# Patient Record
Sex: FEMALE | Race: OTHER | Employment: FULL TIME | URBAN - METROPOLITAN AREA
[De-identification: names, ages, dates, MRNs, and addresses within clinical notes are randomized per-mention and may not be internally consistent; named-entity substitution may affect disease eponyms.]

---

## 2017-10-09 ENCOUNTER — HOSPITAL ENCOUNTER (OUTPATIENT)
Dept: WOMENS IMAGING | Age: 52
Discharge: HOME | End: 2017-10-09
Payer: COMMERCIAL

## 2017-10-09 DIAGNOSIS — Z12.39 ENCOUNTER FOR SCREENING BREAST EXAMINATION: ICD-10-CM

## 2017-10-09 PROCEDURE — G0202 SCR MAMMO BI INCL CAD: HCPCS

## 2018-12-05 ENCOUNTER — HOSPITAL ENCOUNTER (OUTPATIENT)
Dept: WOMENS IMAGING | Age: 53
Discharge: HOME | End: 2018-12-07
Payer: COMMERCIAL

## 2018-12-05 DIAGNOSIS — Z12.31 ENCOUNTER FOR SCREENING MAMMOGRAM FOR BREAST CANCER: ICD-10-CM

## 2018-12-05 PROCEDURE — 77067 SCR MAMMO BI INCL CAD: CPT

## 2019-12-10 ENCOUNTER — HOSPITAL ENCOUNTER (OUTPATIENT)
Dept: WOMENS IMAGING | Age: 54
Discharge: HOME | End: 2019-12-12
Payer: COMMERCIAL

## 2019-12-10 DIAGNOSIS — Z12.31 ENCOUNTER FOR SCREENING MAMMOGRAM FOR BREAST CANCER: ICD-10-CM

## 2019-12-10 PROCEDURE — 77063 BREAST TOMOSYNTHESIS BI: CPT

## 2021-03-02 ENCOUNTER — HOSPITAL ENCOUNTER (OUTPATIENT)
Dept: WOMENS IMAGING | Age: 56
Discharge: HOME | End: 2021-03-04
Payer: COMMERCIAL

## 2021-03-02 VITALS — HEIGHT: 63 IN | BODY MASS INDEX: 24.27 KG/M2

## 2021-03-02 DIAGNOSIS — Z12.31 ENCOUNTER FOR SCREENING MAMMOGRAM FOR MALIGNANT NEOPLASM OF BREAST: ICD-10-CM

## 2021-03-02 PROCEDURE — 77063 BREAST TOMOSYNTHESIS BI: CPT

## 2022-02-10 ENCOUNTER — OFFICE VISIT (OUTPATIENT)
Dept: ORTHOPEDIC SURGERY | Age: 57
End: 2022-02-10
Payer: COMMERCIAL

## 2022-02-10 VITALS
WEIGHT: 137 LBS | HEART RATE: 70 BPM | TEMPERATURE: 97.7 F | BODY MASS INDEX: 24.27 KG/M2 | HEIGHT: 63 IN | OXYGEN SATURATION: 99 %

## 2022-02-10 DIAGNOSIS — M17.12 PRIMARY OSTEOARTHRITIS OF LEFT KNEE: Primary | ICD-10-CM

## 2022-02-10 PROCEDURE — 99214 OFFICE O/P EST MOD 30 MIN: CPT | Performed by: ORTHOPAEDIC SURGERY

## 2022-02-10 RX ORDER — METHYLPREDNISOLONE 4 MG/1
TABLET ORAL
Qty: 21 TABLET | Refills: 0 | Status: SHIPPED | OUTPATIENT
Start: 2022-02-10

## 2022-02-11 NOTE — PROGRESS NOTES
Patient ID:  Hudson Taylor is a 60 y.o. female who presents today for follow up of left knee pain. I have seen her in the past.  The patient has tried multiple anti-inflammatories. She has tried ibuprofen and Naprosyn and meloxicam per her report. She had an intra-articular steroid injection and it failed to provide her with any relief. That is, other than the local anesthetic for a few hours after the injection. She is here today complaining of left medial and anterior knee pain    Injury: no    Location: left knee pain, located on the medial and anterior aspect of the knee  Pain: yes; 7 on a scale of 1 to 10  Onset: gradual  Duration: 2 years  Frequency:  occurs daily  Quality: aching and boring   Swelling: patient notes intermittent swelling of the joint  Aggravating factors: weight bearing activity, standing and walking  Alleviating factors: removing weight from leg and rest  Mechanical symptoms: catching and grinding  Radiation: no    Activities: walking independently  Restriction:  decreased ambulatory tolerance  Progression:  worsening    Previous treatment:  anti-inflammatories and steroid injection   NSAIDs:  ibuprofen/motrin, aleve, naproxyn and meloxicam  PT:  none    Medications:    Current Outpatient Medications on File Prior to Visit   Medication Sig Dispense Refill    zolpidem (AMBIEN CR) 12.5 MG CR tablet Take 12.5 mg by mouth      escitalopram (LEXAPRO) 10 MG tablet Take 10 mg by mouth       No current facility-administered medications on file prior to visit.        Allergies:    No Known Allergies    Physical Exam:    Examination of the left knee    Gait:  antalgic gait affecting left  Inspection:  neutral  Swelling:  none  Erythema:  none  Ecchymosis:  none  Effusion:  none  Palpation:  distal medial femoral condyle, medial joint line and lateral patella  Extension:  5  Flexion:  120  Strength:  5  Varus/Valgus stability:  none  Anterior/Posterior Instability:  none  Antwon: negative  Thessaly:  positive  Modified Apley:  negative  Lachman:  negative  Patellar compression:  positive  Neurological/Vascular:  Sensation grossly intact. Dorsalis pedis palpable and 2+    Radiographs:  Radiographs of the left knee were personally reviewed, bilateral standing AP, bilateral notch, lateral left knee and bilateral sunrise and they revealed: The patient has a right partial medial knee replacement in place. It appears to be well fixed and well aligned. There is some progression of the lateral osteoarthritis on the right side. On the left side, the patient has moderate to severe osteoarthritis of the medial compartment of the left knee. She is maintaining a joint space laterally. She does have bilateral patellofemoral degenerative wear. No fractures. No bone lesions. MRI: None    Diagnosis:   Diagnosis Orders   1. Primary osteoarthritis of left knee         Plan:    The patient has osteoarthritis of the left knee. Treatment options were discussed. This is a young patient she is 60years old. She is employed as a . She wishes to hold off on surgery as long as she can. 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 She had a steroid injection which failed to provide her with any relief other than transient relief from the local anesthetic. She has tried multiple anti-inflammatories. She has modified her activity. So, together we discussed the other treatment options. This would consist of viscosupplementation, physical therapy, and knee replacement surgery. The patient wishes to hold off on surgery if possible. I think she would be a good candidate for a course of viscosupplementation. We shall submit for authorization. Rationale for Viscosupplementation: Renewal Request   As a part of a multimodal treatment plan, we are requesting authorization of hyaluronic acid viscosupplementation injections for the improvement of symptoms related to osteoarthritis.  Authorization is being requested for treatment of the Left knee. Rationale for authorization of these injections is based on the following elements:     Signs and Symptoms   Length of symptoms > 3 months   Pain interferes with ADLs? Yes   Radiographic evidence of OA? Yes     Previous Treatments   Bracing attempted? No   Formal PT attempted? No  NSAID medication attempted? Yes   Corticosteroid injection attempted? Yes   Weight management attempted? No -not required    Prior Viscosupplementation   Prior viscosupplementation? No  Prior viscosupplementation improved  symptoms? N/A   Prior viscosupplementation improved  symptoms at least 6 months? N/A      Requested Viscosupplementation   Preferred product: Durolane   Alternative product: Synvisc One or payor preferred        No orders of the defined types were placed in this encounter. Orders Placed This Encounter   Medications    methylPREDNISolone (MEDROL DOSEPACK) 4 MG tablet     Sig: On days 1-6 take as directed on package for first 6-day course. Dispense:  21 tablet     Refill:  0       Return for For viscosupplementation if approved.     Miguel Ángel Bella MD

## 2022-02-28 ENCOUNTER — TELEPHONE (OUTPATIENT)
Dept: ORTHOPEDIC SURGERY | Age: 57
End: 2022-02-28

## 2022-02-28 NOTE — TELEPHONE ENCOUNTER
Per Zan, effective 12/01/2017 Intra articular Hyaluronan injection of the knee are considered not medically necessary and will no longer be covered with [facility].

## 2022-05-12 ENCOUNTER — HOSPITAL ENCOUNTER (OUTPATIENT)
Dept: WOMENS IMAGING | Age: 57
Discharge: HOME | End: 2022-05-14
Payer: COMMERCIAL

## 2022-05-12 VITALS — BODY MASS INDEX: 24.27 KG/M2 | HEIGHT: 63 IN

## 2022-05-12 DIAGNOSIS — Z12.31 SCREENING MAMMOGRAM, ENCOUNTER FOR: ICD-10-CM

## 2022-05-12 PROCEDURE — 77063 BREAST TOMOSYNTHESIS BI: CPT

## 2022-06-28 DIAGNOSIS — M17.12 PRIMARY OSTEOARTHRITIS OF LEFT KNEE: Primary | ICD-10-CM

## 2022-06-30 ENCOUNTER — OFFICE VISIT (OUTPATIENT)
Dept: ORTHOPEDIC SURGERY | Age: 57
End: 2022-06-30
Payer: COMMERCIAL

## 2022-06-30 VITALS
HEART RATE: 68 BPM | BODY MASS INDEX: 24.27 KG/M2 | HEIGHT: 63 IN | TEMPERATURE: 97 F | OXYGEN SATURATION: 93 % | WEIGHT: 137 LBS

## 2022-06-30 DIAGNOSIS — M17.12 PRIMARY OSTEOARTHRITIS OF LEFT KNEE: Primary | ICD-10-CM

## 2022-06-30 PROCEDURE — 20610 DRAIN/INJ JOINT/BURSA W/O US: CPT | Performed by: ORTHOPAEDIC SURGERY

## 2022-06-30 PROCEDURE — 99214 OFFICE O/P EST MOD 30 MIN: CPT | Performed by: ORTHOPAEDIC SURGERY

## 2022-06-30 RX ORDER — GABAPENTIN 100 MG/1
CAPSULE ORAL
COMMUNITY
Start: 2022-06-20

## 2022-06-30 RX ORDER — LIDOCAINE HYDROCHLORIDE 10 MG/ML
8 INJECTION, SOLUTION INFILTRATION; PERINEURAL ONCE
Status: COMPLETED | OUTPATIENT
Start: 2022-06-30 | End: 2022-06-30

## 2022-06-30 RX ORDER — TRIAMCINOLONE ACETONIDE 40 MG/ML
80 INJECTION, SUSPENSION INTRA-ARTICULAR; INTRAMUSCULAR ONCE
Status: COMPLETED | OUTPATIENT
Start: 2022-06-30 | End: 2022-06-30

## 2022-06-30 RX ORDER — CELECOXIB 200 MG/1
200 CAPSULE ORAL DAILY
COMMUNITY
Start: 2022-02-11 | End: 2022-08-10

## 2022-06-30 RX ADMIN — LIDOCAINE HYDROCHLORIDE 8 ML: 10 INJECTION, SOLUTION INFILTRATION; PERINEURAL at 16:05

## 2022-06-30 RX ADMIN — TRIAMCINOLONE ACETONIDE 80 MG: 40 INJECTION, SUSPENSION INTRA-ARTICULAR; INTRAMUSCULAR at 16:06

## 2022-06-30 NOTE — PROGRESS NOTES
Patient's name, date of birth, and allergies have been confirmed. Patient is aware that injection is to be given in Left knee. He/she is aware that they will be seeing Dr. Marzena Fraga and the injection will be given by him.

## 2022-06-30 NOTE — PROGRESS NOTES
Patient ID:  Julio Romero is a 60 y.o. female who presents today for follow up of left knee pain. Injury: no    Location: left knee pain, located on the medial, lateral and anterior aspect of the knee  Pain: yes; 7 on a scale of 1 to 10  Onset: gradual  Duration: 2 years  Frequency:  occurs daily  Quality: aching and boring   Swelling: patient notes intermittent swelling of the joint  Aggravating factors: weight bearing activity, standing and walking  Alleviating factors: removing weight from leg and rest  Mechanical symptoms: none  Radiation: no    Activities: walking independently  Restriction:  decreased ambulatory tolerance  Progression:  worsening    Previous treatment:  anti-inflammatories and steroid injection   NSAIDs:  ibuprofen/motrin, naproxyn, meloxicam and celebrex  PT:  none    Medications:    Current Outpatient Medications on File Prior to Visit   Medication Sig Dispense Refill    celecoxib (CELEBREX) 200 MG capsule Take 200 mg by mouth daily      gabapentin (NEURONTIN) 100 MG capsule take 1 capsule by mouth at bedtime      zolpidem (AMBIEN CR) 12.5 MG CR tablet Take 12.5 mg by mouth      escitalopram (LEXAPRO) 10 MG tablet Take 10 mg by mouth      methylPREDNISolone (MEDROL DOSEPACK) 4 MG tablet On days 1-6 take as directed on package for first 6-day course. (Patient not taking: Reported on 6/30/2022) 21 tablet 0     No current facility-administered medications on file prior to visit.        Allergies:    No Known Allergies    Physical Exam:    Examination of the left knee    Gait:  antalgic gait affecting left  Inspection:  genu varus  Swelling:  none  Erythema:  none  Ecchymosis:  none  Effusion:  1+  Palpation:  distal medial femoral condyle, medial joint line, distal lateral femoral condyle and lateral patella  Extension:  5  Flexion:  120  Strength:  5  Varus/Valgus stability:  none  Anterior/Posterior Instability:  none  Antwon:  negative  Thessaly:  positive  Modified Apley: negative  Lachman:  negative  Patellar compression:  positive  Neurological/Vascular:  Sensation grossly intact. Dorsalis pedis palpable and 2+    Radiographs:  Radiographs of the left knee were personally reviewed, bilateral standing AP, bilateral notch, bilateral sunrise and lateral left knee and they revealed:  no evidence of fracture, severe medial knee oa, patellofemoral knee oa and The lateral joint space is not normal either; there is slight narrowing there as well. No intra-articular loose bodies. No destructive bony lesions are noted. On the right side, the patient has a partial medial knee replacement in place. It is cemented. It is well fixed. No evidence of any loosening. She has developed some lateral and mild patellofemoral osteoarthritis on the right side as well. The polyethylene is appropriately positioned within the partial medial knee replacement. MRI: None    Diagnosis:   Diagnosis Orders   1. Primary osteoarthritis of left knee  MN ARTHROCENTESIS ASPIR&/INJ MAJOR JT/BURSA W/O US       Plan:    Patient has osteoarthritis of the right knee. I thought that she was a good candidate for viscosupplementation but this was denied by her insurance company. The patient continues to work in heavy construction. Treatment options were discussed. Despite the fact that she did not get a good response from the last steroid injection, she wished to try that again. She is getting pretty miserable in terms of the pain with her vocation. So we will administer the steroid injection. Additionally, working to discontinue the Celebrex which she said did not do much for her. We will start her on Voltaren. I am also going to find out about ordering viscosupplementation from [facility] pharmacies and get back with her.     Time Out  [x] Patient Verified  [x] Site Verified  [x] Laterality Verified  [x] Procedure Verified    Before aspiration/injection risks/benefits of a cortisone injection including infection, local skin irritation, skin atrophy, continued pain/discomfort, elevated blood sugar, burning, failure to relieve pain, possible late infection were discussed with patient. Patient verbalized understanding and wanted to proceed with planned procedure. After prepping and draping the left knee in the usual sterile fashion, 2 cc of 40 mg/ml kenalog with 8 cc of 1% lidocaine were injected intra-articularly without any complications. Patient tolerated this well. Post-procedure discomfort can be alleviated with additional medications/ice/elevation/rest over the first 24 hours as recommended. The patient tolerated the procedure well. If fluid was aspirated it was sent for further studies  in sterile specimen container as ordered to the lab    Orders Placed This Encounter   Procedures    CT ARTHROCENTESIS ASPIR&/INJ MAJOR JT/BURSA W/O US       Orders Placed This Encounter   Medications    diclofenac (VOLTAREN) 50 MG EC tablet     Sig: Take 1 tablet by mouth 2 times daily (with meals)     Dispense:  60 tablet     Refill:  3    lidocaine 1 % injection 8 mL    triamcinolone acetonide (KENALOG-40) injection 80 mg       No follow-ups on file.     Jareth Fallon MD

## 2022-10-28 ENCOUNTER — OFFICE VISIT (OUTPATIENT)
Dept: ORTHOPEDIC SURGERY | Age: 57
End: 2022-10-28
Payer: COMMERCIAL

## 2022-10-28 DIAGNOSIS — M17.12 PRIMARY OSTEOARTHRITIS OF LEFT KNEE: Primary | ICD-10-CM

## 2022-10-28 PROCEDURE — 99214 OFFICE O/P EST MOD 30 MIN: CPT | Performed by: ORTHOPAEDIC SURGERY

## 2022-10-28 NOTE — PROGRESS NOTES
Patient ID:  Jenn Kang is a 59 y.o. female who presents today for follow up of left knee pain. Injury: no    Location: left knee pain, located on the medial and anterior aspect of the knee  Pain: yes; 8 on a scale of 1 to 10  Onset: gradual  Duration: 3 years  Frequency:  occurs daily  Quality: aching and boring   Swelling: patient notes intermittent swelling of the joint  Aggravating factors: weight bearing activity, standing, and walking  Alleviating factors: removing weight from leg and rest  Mechanical symptoms: none  Radiation: no    Activities: walking independently  Restriction:  decreased ambulatory tolerance  Progression:  worsening    Previous treatment:  anti-inflammatories and steroid injection   NSAIDs:  ibuprofen/motrin, naproxyn, and meloxicam  PT:  none    Medications:    Current Outpatient Medications on File Prior to Visit   Medication Sig Dispense Refill    gabapentin (NEURONTIN) 100 MG capsule take 1 capsule by mouth at bedtime      diclofenac (VOLTAREN) 50 MG EC tablet Take 1 tablet by mouth 2 times daily (with meals) 60 tablet 3    zolpidem (AMBIEN CR) 12.5 MG CR tablet Take 12.5 mg by mouth      escitalopram (LEXAPRO) 10 MG tablet Take 10 mg by mouth      celecoxib (CELEBREX) 200 MG capsule Take 200 mg by mouth daily      methylPREDNISolone (MEDROL DOSEPACK) 4 MG tablet On days 1-6 take as directed on package for first 6-day course. (Patient not taking: Reported on 6/30/2022) 21 tablet 0     No current facility-administered medications on file prior to visit.        Allergies:    No Known Allergies    Physical Exam:    Examination of the left knee    Gait:  antalgic gait affecting left  Inspection:  neutral  Swelling:  none  Erythema:  none  Ecchymosis:  none  Effusion:  1+  Palpation:  distal medial femoral condyle, medial joint line, medial patella, and lateral patella  Extension:  5  Flexion:  120  Strength:  5  Varus/Valgus stability:  none  Anterior/Posterior Instability: none  Antwon:  negative  Thessaly:  positive  Modified Apley:  positive  Lachman:  negative  Patellar compression:  positive  Neurological/Vascular:  Sensation grossly intact. Dorsalis pedis palpable and 2+    Radiographs:  None today but prior radiographs show that the patient has severe medial and patellofemoral osteoarthritis on the left knee. MRI: None    Diagnosis:   Diagnosis Orders   1. Primary osteoarthritis of left knee  Basic Metabolic Panel    CBC with Auto Differential    Culture, Urine    Ferritin    Hemoglobin A1C    Iron and TIBC    Urinalysis    Type and screen    Transferrin    Protime-INR    Prealbumin    XR KNEE LEFT (MIN 4 VIEWS)          Plan:    Patient has osteoarthritis of the left knee. She has been dealing with this for a long time. She has reached the end of the line. She wishes to discuss a left total knee replacement. The patient is opted to proceed with a knee replacement surgery. I brought the model in, and we sat down and talked about surgery. We talked about the recovery. I stressed the importance of physical therapy and active participation in physical therapy to the patient in order to achieve a satisfactory postoperative outcome. We went over the risks of surgery. Risks include, but are not limited to, infection, neurovascular injury, hematoma formation, wound healing complications, blood clot, persistent postoperative pain, ligament injury, and risks of anesthesia. The patient expressed understanding and wishes to proceed with a total knee replacement as above. Orders Placed This Encounter   Procedures    Culture, Urine     Standing Status:   Future     Standing Expiration Date:   10/28/2023     Order Specific Question:   Specify (ex-cath, midstream, cysto, etc)?      Answer:   MIDSTREAM    XR KNEE LEFT (MIN 4 VIEWS)     Standing Status:   Future     Standing Expiration Date:   10/28/2023     Scheduling Instructions:      Bilateral WB AP, lateral left knee, bilateral skyline, bilateral tunnel     Order Specific Question:   Reason for exam:     Answer:   knee pain    Basic Metabolic Panel     Standing Status:   Future     Standing Expiration Date:   10/28/2023    CBC with Auto Differential     Standing Status:   Future     Standing Expiration Date:   10/28/2023    Ferritin     Standing Status:   Future     Standing Expiration Date:   10/28/2023    Hemoglobin A1C     Standing Status:   Future     Standing Expiration Date:   10/28/2023    Iron and TIBC     Standing Status:   Future     Standing Expiration Date:   10/28/2023     Order Specific Question:   Is Patient Fasting? Answer:   NO     Order Specific Question:   No of Hours? Answer:   0    Urinalysis     Standing Status:   Future     Standing Expiration Date:   10/28/2023    Transferrin     Standing Status:   Future     Standing Expiration Date:   10/28/2023    Protime-INR     Standing Status:   Future     Standing Expiration Date:   10/28/2023     Order Specific Question:   Daily Coumadin Dose? Answer:   NO    Prealbumin     Standing Status:   Future     Standing Expiration Date:   10/28/2023    Type and screen     Standing Status:   Future     Standing Expiration Date:   10/28/2023       Orders Placed This Encounter   Medications    Misc. Devices (BATH/SHOWER SEAT) MISC     Sig: Dispense 1 Shower/Bath seat     Dispense:  1 each     Refill:  0    Misc. Devices (CLASSICS ROLLING WALKER) MISC     Sig: Dispense 1 rolling walker     Dispense:  1 each     Refill:  0    Misc. Devices (RAISED TOILET SEAT) MISC     Sig: Dispense 1 raised toilet seat     Dispense:  1 each     Refill:  0       No follow-ups on file.     Carlton Rey MD        Surgery Phone: [phone] [place] [facility]   Surgery Fax: [phone]    Phone: [phone]          Fax: [phone]    Orthopedics: Surgery Scheduling, PAT & PRE-OP Order Form  Call to advance [place] at [phone] at least 24 hours prior to date of service     Surgery Location: [facility] Surgery: [facility], [address] [place] [address] Road  OFFICE   Surgeon's Isaac Aviles MD Surgery Date: 22  Time: to follow  Patient's Name: Brendon Mendieta : 1965    #:  [id number]  Gender: female  Home Phone:  [phone] Cell Phone: [phone]  Emergency Contact:  Georgina Pacheco   Phone: [phone]  Payor: Deyvi Richard /  /  /    ID No.: [id number]      PROVIDER TO COMPLETE:  Diagnosis: The encounter diagnosis was Primary osteoarthritis of left knee. Procedure/Consent: Left total knee arthroplasty  CPT CODES: 56403  Case Comments/Implants: [employer] [employer] Total Knee System  Surgery Scheduled as: Outpatient  Anesthesia Requested: Choice with adductor canal block  Referring Family Doctor: Edwige Wilcox MD   PAT  [x] Corie LEYVA Date/Time:                                                            [x] History & Physical [] Physician will Provide [] Attached [] Dictated [] Other  [x] Follow Anesthesia Pre-Op Orders for X-rays, [facility]     [x] SN & PT to evaluate and treat/educate disease management, medications, home safety & equipment needs for total joint patients  [] Other: ____________________________________________________  Consults: Medical/Cardiac Clearance done by  ____________________  PRE-OP ORDERS:   Allergies: Patient has no known allergies.  Latex Allergies:             Diabetic:           [] IV ________________________  [x] IV Start with J-loop     Preprinted Orders: Attached [] Yes [] No   ANTIBIOTIC PRE-OP: [x] ANCEF 2 gram IVPB if > 120 kg 3 grams IVPB within 1 hour of incision, if ALLERGIC, use VANCOMYCIN 1 gram IV, 2 hours pre-op  [x] TXA Protocol [] Other:   [x] NPO   [] Betablocker (if needed) _____________________________________   [x] Knee high anti-embolic hose [] Thigh high anti-embolic hose   Other: _____________ chlorhexidine_________________________________________    Physician Signature Required:    Date/Time: 10/28/2022

## 2022-12-06 ENCOUNTER — OFFICE VISIT (OUTPATIENT)
Dept: ORTHOPEDIC SURGERY | Age: 57
End: 2022-12-06

## 2022-12-06 VITALS
SYSTOLIC BLOOD PRESSURE: 139 MMHG | HEIGHT: 63 IN | TEMPERATURE: 97.1 F | BODY MASS INDEX: 25.25 KG/M2 | WEIGHT: 142.5 LBS | OXYGEN SATURATION: 99 % | HEART RATE: 66 BPM | DIASTOLIC BLOOD PRESSURE: 80 MMHG

## 2022-12-06 DIAGNOSIS — M17.12 PRIMARY OSTEOARTHRITIS OF LEFT KNEE: Primary | ICD-10-CM

## 2022-12-06 DIAGNOSIS — Z01.818 PREOP EXAMINATION: ICD-10-CM

## 2022-12-06 RX ORDER — CHLORHEXIDINE GLUCONATE 213 G/1000ML
SOLUTION TOPICAL
Qty: 118 ML | Refills: 0 | Status: SHIPPED | OUTPATIENT
Start: 2022-12-06

## 2022-12-06 NOTE — PROGRESS NOTES
Subjective:     Patient is to be admitted for left total knee arthroplasty performed by Dr. Fernandez Sahni at [facility] on December 12, 2022. This is a pre operative examination consultation. Patient is a 59 y.o.  female presented with a history of pain in the left knee. Onset of symptoms was gradual 2 years ago with gradually worsening course since that time. The patient noted no past surgery on the left knee. Prior procedures on the knee include none. Patient has been treated conservatively with over-the-counter NSAIDs and activity modification. Patient currently rates pain in the knee at 7 out of 10 with activity. There is pain at night. There are no problems to display for this patient. Past Medical History:   Diagnosis Date    Insomnia       Past Surgical History:   Procedure Laterality Date    KNEE SURGERY        Not in a hospital admission. No Known Allergies   Social History     Tobacco Use    Smoking status: Former     Packs/day: 1.00     Years: 10.00     Pack years: 10.00     Types: Cigarettes     Quit date: 2012     Years since quitting: 10.9    Smokeless tobacco: Never   Substance Use Topics    Alcohol use: Yes      Family History   Problem Relation Age of Onset    Cancer Maternal Grandmother     Breast Cancer Maternal Grandmother       Review of Systems  A comprehensive review of systems was negative except for: Musculoskeletal: positive for left knee pain    Objective:     @IPVITALS[8@  /80 (Site: Left Upper Arm, Position: Sitting, Cuff Size: Medium Adult)   Pulse 66   Temp 97.1 °F (36.2 °C) (Temporal)   Ht 5' 3\" (1.6 m)   Wt 142 lb 8 oz (64.6 kg)   LMP 03/10/2016   SpO2 99%   BMI 25.24 kg/m²   General appearance: alert, appears stated age, and cooperative  Head: Normocephalic, without obvious abnormality, atraumatic  Eyes: conjunctivae/corneas clear. PERRL, EOM's intact. Fundi benign.   Ears: normal TM's and external ear canals both ears  Nose: Nares normal. Septum midline. Mucosa normal. No drainage or sinus tenderness. Throat: lips, mucosa, and tongue normal; teeth and gums normal  Neck: no adenopathy, no carotid bruit, no JVD, supple, symmetrical, trachea midline, and thyroid not enlarged, symmetric, no tenderness/mass/nodules  Back: symmetric, no curvature. ROM normal. No CVA tenderness. Lungs: clear to auscultation bilaterally  Heart: regular rate and rhythm, S1, S2 normal, no murmur, click, rub or gallop  Abdomen: soft, non-tender; bowel sounds normal; no masses,  no organomegaly  Extremities: extremities normal, atraumatic, no cyanosis or edema  Pulses: 2+ and symmetric  Skin: Skin color, texture, turgor normal. No rashes or lesions  Lymph nodes: Cervical, supraclavicular, and axillary nodes normal.  Neurologic: Grossly normal    Imaging Review  Plain radiographs demonstrate severe degenerative joint disease of the left knee. The overall alignment is significant varus. The bone quality appears to be good for age and reported activity level. EKG shows NS rhythm rate of 68 with no acute changes. Assessment:     End stage arthritis, left knee     Plan:     The patient history, physical examination and imaging studies are consistent with advanced degenerative joint disease of the left knee. The treatment options including medical management, injection therapy arthroscopy and arthroplasty were discussed at length. The risks and benefits of total knee arthroplasty were presented and reviewed. The risks due to aseptic loosening, infection, stiffness, patella tracking problems, thromboembolic complications among others were discussed. The patient acknowledged the explanation, agreed to proceed with the plan and a consent was signed. I explained to the patient she needs to shower with Hibiclens soap daily for 3 days prior to surgery. She will be n.p.o. at midnight the night before surgery. She will bring her walker with her the morning of surgery.   She does not need to take any medications the morning of surgery.

## 2022-12-07 DIAGNOSIS — M17.12 PRIMARY OSTEOARTHRITIS OF LEFT KNEE: ICD-10-CM

## 2022-12-07 LAB
ANION GAP SERPL CALCULATED.3IONS-SCNC: 11 MEQ/L (ref 9–15)
ANISOCYTOSIS: ABNORMAL
BASOPHILS ABSOLUTE: 0.1 K/UL (ref 0–0.2)
BASOPHILS RELATIVE PERCENT: 2 %
BUN BLDV-MCNC: 18 MG/DL (ref 6–20)
CALCIUM SERPL-MCNC: 9.7 MG/DL (ref 8.5–9.9)
CHLORIDE BLD-SCNC: 100 MEQ/L (ref 95–107)
CO2: 28 MEQ/L (ref 20–31)
CREAT SERPL-MCNC: 0.63 MG/DL (ref 0.5–0.9)
EOSINOPHILS ABSOLUTE: 0.4 K/UL (ref 0–0.7)
EOSINOPHILS RELATIVE PERCENT: 6 %
GFR SERPL CREATININE-BSD FRML MDRD: >60 ML/MIN/{1.73_M2}
GLUCOSE BLD-MCNC: 97 MG/DL (ref 70–99)
HBA1C MFR BLD: 5.7 % (ref 4.8–5.9)
HCT VFR BLD CALC: 37.3 % (ref 37–47)
HEMOGLOBIN: 12.1 G/DL (ref 12–16)
INR BLD: 1
LYMPHOCYTES ABSOLUTE: 2.2 K/UL (ref 1–4.8)
LYMPHOCYTES RELATIVE PERCENT: 32 %
MCH RBC QN AUTO: 26.8 PG (ref 27–31.3)
MCHC RBC AUTO-ENTMCNC: 32.6 % (ref 33–37)
MCV RBC AUTO: 82.4 FL (ref 79.4–94.8)
MICROCYTES: ABNORMAL
MONOCYTES ABSOLUTE: 0.1 K/UL (ref 0.2–0.8)
MONOCYTES RELATIVE PERCENT: 2 %
NEUTROPHILS ABSOLUTE: 4 K/UL (ref 1.4–6.5)
NEUTROPHILS RELATIVE PERCENT: 58 %
PDW BLD-RTO: 12.8 % (ref 11.5–14.5)
PLATELET # BLD: 230 K/UL (ref 130–400)
PLATELET SLIDE REVIEW: NORMAL
POTASSIUM SERPL-SCNC: 4.2 MEQ/L (ref 3.4–4.9)
PREALBUMIN: 23.6 MG/DL (ref 20–40)
PROTHROMBIN TIME: 13 SEC (ref 12.3–14.9)
RBC # BLD: 4.52 M/UL (ref 4.2–5.4)
SODIUM BLD-SCNC: 139 MEQ/L (ref 135–144)
WBC # BLD: 6.9 K/UL (ref 4.8–10.8)

## 2022-12-08 ENCOUNTER — TELEPHONE (OUTPATIENT)
Dept: ORTHOPEDIC SURGERY | Age: 57
End: 2022-12-08

## 2022-12-08 LAB
ABO/RH: NORMAL
ANTIBODY SCREEN: NORMAL
FERRITIN: 132 NG/ML (ref 13–150)
IRON SATURATION: 24 % (ref 20–55)
IRON: 74 UG/DL (ref 37–145)
TOTAL IRON BINDING CAPACITY: 310 UG/DL (ref 250–450)
UNSATURATED IRON BINDING CAPACITY: 236 UG/DL (ref 112–347)

## 2022-12-08 NOTE — TELEPHONE ENCOUNTER
Surgery Scheduling and Authorization Information    Surgery Date:12/12/2022  Surgery good through dates:    CPT Code(s) 52115  Procedure(s)Left total knee arthroplasty        Approved [] Not Required [x] Denied []  Reference # [id number]    How authorization obtained:  [] web portal             []X via phone       [] Via fax    Provider  [] Parish Colindres  [] Chelsy Najera  [x] Natasha Billy  [] Jc Sheridan  [] Shelly Boo  [] Lyric Nichole  [] Carla Gibson  [] Shon Arteaga  [] Aniyah Cyr  [] Xavier Lund        Surgery Sheet Faxed to Scheduling [x]  Surgery and Prior Authorization Information Sheet Scanned [x]

## 2022-12-09 LAB
TRANSFERRIN: 265 MG/DL (ref 200–360)
URINE CULTURE, ROUTINE: NORMAL

## 2022-12-12 ENCOUNTER — HOSPITAL ENCOUNTER (OUTPATIENT)
Age: 57
Discharge: HOME | End: 2022-12-13
Attending: ORTHOPAEDIC SURGERY | Admitting: ORTHOPAEDIC SURGERY
Payer: COMMERCIAL

## 2022-12-12 ENCOUNTER — ANESTHESIA EVENT (OUTPATIENT)
Dept: OPERATING ROOM | Age: 57
End: 2022-12-12
Payer: COMMERCIAL

## 2022-12-12 ENCOUNTER — ANESTHESIA (OUTPATIENT)
Dept: OPERATING ROOM | Age: 57
End: 2022-12-12
Payer: COMMERCIAL

## 2022-12-12 DIAGNOSIS — Z96.652 STATUS POST TOTAL KNEE REPLACEMENT, LEFT: ICD-10-CM

## 2022-12-12 DIAGNOSIS — M17.12 OSTEOARTHRITIS OF LEFT KNEE, UNSPECIFIED OSTEOARTHRITIS TYPE: Primary | ICD-10-CM

## 2022-12-12 LAB
ABO/RH: NORMAL
ANTIBODY SCREEN: NORMAL

## 2022-12-12 PROCEDURE — G0378 HOSPITAL OBSERVATION PER HR: HCPCS

## 2022-12-12 PROCEDURE — 6360000002 HC RX W HCPCS: Performed by: ANESTHESIOLOGY

## 2022-12-12 PROCEDURE — 3700000000 HC ANESTHESIA ATTENDED CARE: Performed by: ORTHOPAEDIC SURGERY

## 2022-12-12 PROCEDURE — 86850 RBC ANTIBODY SCREEN: CPT

## 2022-12-12 PROCEDURE — 6360000002 HC RX W HCPCS: Performed by: ORTHOPAEDIC SURGERY

## 2022-12-12 PROCEDURE — 2580000003 HC RX 258: Performed by: ORTHOPAEDIC SURGERY

## 2022-12-12 PROCEDURE — 86901 BLOOD TYPING SEROLOGIC RH(D): CPT

## 2022-12-12 PROCEDURE — 76942 ECHO GUIDE FOR BIOPSY: CPT | Performed by: ANESTHESIOLOGY

## 2022-12-12 PROCEDURE — 2580000003 HC RX 258: Performed by: ANESTHESIOLOGY

## 2022-12-12 PROCEDURE — 27447 TOTAL KNEE ARTHROPLASTY: CPT | Performed by: ORTHOPAEDIC SURGERY

## 2022-12-12 PROCEDURE — C1776 JOINT DEVICE (IMPLANTABLE): HCPCS | Performed by: ORTHOPAEDIC SURGERY

## 2022-12-12 PROCEDURE — 86900 BLOOD TYPING SEROLOGIC ABO: CPT

## 2022-12-12 PROCEDURE — A4217 STERILE WATER/SALINE, 500 ML: HCPCS | Performed by: ORTHOPAEDIC SURGERY

## 2022-12-12 PROCEDURE — 6370000000 HC RX 637 (ALT 250 FOR IP): Performed by: NURSE PRACTITIONER

## 2022-12-12 PROCEDURE — 6370000000 HC RX 637 (ALT 250 FOR IP): Performed by: ORTHOPAEDIC SURGERY

## 2022-12-12 PROCEDURE — 2720000010 HC SURG SUPPLY STERILE: Performed by: ORTHOPAEDIC SURGERY

## 2022-12-12 PROCEDURE — 3600000004 HC SURGERY LEVEL 4 BASE: Performed by: ORTHOPAEDIC SURGERY

## 2022-12-12 PROCEDURE — 2500000003 HC RX 250 WO HCPCS: Performed by: ORTHOPAEDIC SURGERY

## 2022-12-12 PROCEDURE — 6370000000 HC RX 637 (ALT 250 FOR IP): Performed by: INTERNAL MEDICINE

## 2022-12-12 PROCEDURE — 2709999900 HC NON-CHARGEABLE SUPPLY: Performed by: ORTHOPAEDIC SURGERY

## 2022-12-12 PROCEDURE — 3700000001 HC ADD 15 MINUTES (ANESTHESIA): Performed by: ORTHOPAEDIC SURGERY

## 2022-12-12 PROCEDURE — 7100000000 HC PACU RECOVERY - FIRST 15 MIN: Performed by: ORTHOPAEDIC SURGERY

## 2022-12-12 PROCEDURE — 3600000014 HC SURGERY LEVEL 4 ADDTL 15MIN: Performed by: ORTHOPAEDIC SURGERY

## 2022-12-12 PROCEDURE — 2500000003 HC RX 250 WO HCPCS: Performed by: ANESTHESIOLOGY

## 2022-12-12 PROCEDURE — 7100000001 HC PACU RECOVERY - ADDTL 15 MIN: Performed by: ORTHOPAEDIC SURGERY

## 2022-12-12 DEVICE — IMPL CAPPED KNEE K2 TOTAL/HEMI ADV CEMENTLESS STRYKER: Type: IMPLANTABLE DEVICE | Site: KNEE | Status: FUNCTIONAL

## 2022-12-12 DEVICE — CRUCIATE RETAINING FEMORAL
Type: IMPLANTABLE DEVICE | Site: KNEE | Status: FUNCTIONAL
Brand: TRIATHLON

## 2022-12-12 DEVICE — INSERT TIB CS 4 10 MM ARTC POST KNEE BEAR TECHNOLOGY X3: Type: IMPLANTABLE DEVICE | Site: KNEE | Status: FUNCTIONAL

## 2022-12-12 DEVICE — TIBIAL COMPONENT
Type: IMPLANTABLE DEVICE | Site: KNEE | Status: FUNCTIONAL
Brand: TRIATHLON

## 2022-12-12 DEVICE — PATELLA
Type: IMPLANTABLE DEVICE | Site: KNEE | Status: FUNCTIONAL
Brand: TRIATHLON

## 2022-12-12 RX ORDER — GABAPENTIN 100 MG/1
100 CAPSULE ORAL NIGHTLY
Status: DISCONTINUED | OUTPATIENT
Start: 2022-12-12 | End: 2022-12-13 | Stop reason: HOSPADM

## 2022-12-12 RX ORDER — OXYCODONE HYDROCHLORIDE 5 MG/1
5 TABLET ORAL EVERY 4 HOURS PRN
Status: DISCONTINUED | OUTPATIENT
Start: 2022-12-12 | End: 2022-12-13 | Stop reason: HOSPADM

## 2022-12-12 RX ORDER — SODIUM CHLORIDE, SODIUM LACTATE, POTASSIUM CHLORIDE, CALCIUM CHLORIDE 600; 310; 30; 20 MG/100ML; MG/100ML; MG/100ML; MG/100ML
1000 INJECTION, SOLUTION INTRAVENOUS CONTINUOUS
Status: DISCONTINUED | OUTPATIENT
Start: 2022-12-12 | End: 2022-12-12 | Stop reason: HOSPADM

## 2022-12-12 RX ORDER — TRANEXAMIC ACID 10 MG/ML
1000 INJECTION, SOLUTION INTRAVENOUS ONCE
Status: DISCONTINUED | OUTPATIENT
Start: 2022-12-12 | End: 2022-12-12 | Stop reason: HOSPADM

## 2022-12-12 RX ORDER — PROPOFOL 10 MG/ML
INJECTION, EMULSION INTRAVENOUS CONTINUOUS PRN
Status: DISCONTINUED | OUTPATIENT
Start: 2022-12-12 | End: 2022-12-12 | Stop reason: SDUPTHER

## 2022-12-12 RX ORDER — SODIUM CHLORIDE, SODIUM LACTATE, POTASSIUM CHLORIDE, CALCIUM CHLORIDE 600; 310; 30; 20 MG/100ML; MG/100ML; MG/100ML; MG/100ML
INJECTION, SOLUTION INTRAVENOUS CONTINUOUS
Status: DISCONTINUED | OUTPATIENT
Start: 2022-12-12 | End: 2022-12-13 | Stop reason: HOSPADM

## 2022-12-12 RX ORDER — DIPHENHYDRAMINE HYDROCHLORIDE 50 MG/ML
12.5 INJECTION INTRAMUSCULAR; INTRAVENOUS
Status: DISCONTINUED | OUTPATIENT
Start: 2022-12-12 | End: 2022-12-12 | Stop reason: HOSPADM

## 2022-12-12 RX ORDER — ASPIRIN 81 MG/1
81 TABLET ORAL 2 TIMES DAILY
Status: DISCONTINUED | OUTPATIENT
Start: 2022-12-12 | End: 2022-12-13 | Stop reason: HOSPADM

## 2022-12-12 RX ORDER — ONDANSETRON 4 MG/1
4 TABLET, ORALLY DISINTEGRATING ORAL EVERY 8 HOURS PRN
Status: DISCONTINUED | OUTPATIENT
Start: 2022-12-12 | End: 2022-12-13 | Stop reason: HOSPADM

## 2022-12-12 RX ORDER — SCOLOPAMINE TRANSDERMAL SYSTEM 1 MG/1
1 PATCH, EXTENDED RELEASE TRANSDERMAL ONCE
Status: DISCONTINUED | OUTPATIENT
Start: 2022-12-12 | End: 2022-12-13 | Stop reason: HOSPADM

## 2022-12-12 RX ORDER — CELECOXIB 100 MG/1
200 CAPSULE ORAL ONCE
Status: COMPLETED | OUTPATIENT
Start: 2022-12-12 | End: 2022-12-12

## 2022-12-12 RX ORDER — LIDOCAINE HYDROCHLORIDE 10 MG/ML
1 INJECTION, SOLUTION INFILTRATION; PERINEURAL
Status: DISCONTINUED | OUTPATIENT
Start: 2022-12-12 | End: 2022-12-12 | Stop reason: HOSPADM

## 2022-12-12 RX ORDER — HYDROMORPHONE HCL 110MG/55ML
0.5 PATIENT CONTROLLED ANALGESIA SYRINGE INTRAVENOUS EVERY 10 MIN PRN
Status: DISCONTINUED | OUTPATIENT
Start: 2022-12-12 | End: 2022-12-12 | Stop reason: HOSPADM

## 2022-12-12 RX ORDER — SODIUM CHLORIDE 9 MG/ML
INJECTION, SOLUTION INTRAVENOUS PRN
Status: DISCONTINUED | OUTPATIENT
Start: 2022-12-12 | End: 2022-12-13 | Stop reason: HOSPADM

## 2022-12-12 RX ORDER — FENTANYL CITRATE 50 UG/ML
50 INJECTION, SOLUTION INTRAMUSCULAR; INTRAVENOUS EVERY 10 MIN PRN
Status: DISCONTINUED | OUTPATIENT
Start: 2022-12-12 | End: 2022-12-12 | Stop reason: HOSPADM

## 2022-12-12 RX ORDER — ACETAMINOPHEN 500 MG
1000 TABLET ORAL ONCE
Status: COMPLETED | OUTPATIENT
Start: 2022-12-12 | End: 2022-12-12

## 2022-12-12 RX ORDER — OXYCODONE HYDROCHLORIDE 5 MG/1
10 TABLET ORAL EVERY 4 HOURS PRN
Status: DISCONTINUED | OUTPATIENT
Start: 2022-12-12 | End: 2022-12-13 | Stop reason: HOSPADM

## 2022-12-12 RX ORDER — METOCLOPRAMIDE HYDROCHLORIDE 5 MG/ML
10 INJECTION INTRAMUSCULAR; INTRAVENOUS
Status: DISCONTINUED | OUTPATIENT
Start: 2022-12-12 | End: 2022-12-12 | Stop reason: HOSPADM

## 2022-12-12 RX ORDER — GABAPENTIN 100 MG/1
100 CAPSULE ORAL ONCE
Status: COMPLETED | OUTPATIENT
Start: 2022-12-12 | End: 2022-12-12

## 2022-12-12 RX ORDER — OXYCODONE HYDROCHLORIDE 5 MG/1
5 TABLET ORAL PRN
Status: DISCONTINUED | OUTPATIENT
Start: 2022-12-12 | End: 2022-12-12 | Stop reason: HOSPADM

## 2022-12-12 RX ORDER — ONDANSETRON 2 MG/ML
4 INJECTION INTRAMUSCULAR; INTRAVENOUS
Status: DISCONTINUED | OUTPATIENT
Start: 2022-12-12 | End: 2022-12-12 | Stop reason: HOSPADM

## 2022-12-12 RX ORDER — SODIUM CHLORIDE 0.9 % (FLUSH) 0.9 %
5-40 SYRINGE (ML) INJECTION PRN
Status: DISCONTINUED | OUTPATIENT
Start: 2022-12-12 | End: 2022-12-12 | Stop reason: HOSPADM

## 2022-12-12 RX ORDER — MEPERIDINE HYDROCHLORIDE 50 MG/ML
12.5 INJECTION INTRAMUSCULAR; INTRAVENOUS; SUBCUTANEOUS
Status: DISCONTINUED | OUTPATIENT
Start: 2022-12-12 | End: 2022-12-12 | Stop reason: HOSPADM

## 2022-12-12 RX ORDER — KETOROLAC TROMETHAMINE 30 MG/ML
30 INJECTION, SOLUTION INTRAMUSCULAR; INTRAVENOUS EVERY 6 HOURS
Status: COMPLETED | OUTPATIENT
Start: 2022-12-12 | End: 2022-12-13

## 2022-12-12 RX ORDER — SODIUM CHLORIDE 0.9 % (FLUSH) 0.9 %
5-40 SYRINGE (ML) INJECTION EVERY 12 HOURS SCHEDULED
Status: DISCONTINUED | OUTPATIENT
Start: 2022-12-12 | End: 2022-12-13 | Stop reason: HOSPADM

## 2022-12-12 RX ORDER — SODIUM CHLORIDE 0.9 % (FLUSH) 0.9 %
5-40 SYRINGE (ML) INJECTION PRN
Status: DISCONTINUED | OUTPATIENT
Start: 2022-12-12 | End: 2022-12-13 | Stop reason: HOSPADM

## 2022-12-12 RX ORDER — BUPIVACAINE HYDROCHLORIDE 7.5 MG/ML
INJECTION, SOLUTION INTRASPINAL PRN
Status: DISCONTINUED | OUTPATIENT
Start: 2022-12-12 | End: 2022-12-12 | Stop reason: SDUPTHER

## 2022-12-12 RX ORDER — TRANEXAMIC ACID 10 MG/ML
1000 INJECTION, SOLUTION INTRAVENOUS ONCE
Status: COMPLETED | OUTPATIENT
Start: 2022-12-12 | End: 2022-12-12

## 2022-12-12 RX ORDER — OXYCODONE HCL 10 MG/1
10 TABLET, FILM COATED, EXTENDED RELEASE ORAL ONCE
Status: COMPLETED | OUTPATIENT
Start: 2022-12-12 | End: 2022-12-12

## 2022-12-12 RX ORDER — ONDANSETRON 2 MG/ML
4 INJECTION INTRAMUSCULAR; INTRAVENOUS EVERY 6 HOURS PRN
Status: DISCONTINUED | OUTPATIENT
Start: 2022-12-12 | End: 2022-12-13 | Stop reason: HOSPADM

## 2022-12-12 RX ORDER — MAGNESIUM HYDROXIDE 1200 MG/15ML
LIQUID ORAL CONTINUOUS PRN
Status: COMPLETED | OUTPATIENT
Start: 2022-12-12 | End: 2022-12-12

## 2022-12-12 RX ORDER — DEXAMETHASONE SODIUM PHOSPHATE 10 MG/ML
INJECTION, SOLUTION INTRAMUSCULAR; INTRAVENOUS
Status: DISPENSED
Start: 2022-12-12 | End: 2022-12-13

## 2022-12-12 RX ORDER — ROPIVACAINE HYDROCHLORIDE 5 MG/ML
INJECTION, SOLUTION EPIDURAL; INFILTRATION; PERINEURAL
Status: DISPENSED
Start: 2022-12-12 | End: 2022-12-13

## 2022-12-12 RX ORDER — MIDAZOLAM HYDROCHLORIDE 5 MG/ML
INJECTION, SOLUTION INTRAMUSCULAR; INTRAVENOUS
Status: DISPENSED
Start: 2022-12-12 | End: 2022-12-13

## 2022-12-12 RX ORDER — OXYCODONE HYDROCHLORIDE 5 MG/1
10 TABLET ORAL PRN
Status: DISCONTINUED | OUTPATIENT
Start: 2022-12-12 | End: 2022-12-12 | Stop reason: HOSPADM

## 2022-12-12 RX ORDER — MIDAZOLAM HYDROCHLORIDE 1 MG/ML
INJECTION INTRAMUSCULAR; INTRAVENOUS PRN
Status: DISCONTINUED | OUTPATIENT
Start: 2022-12-12 | End: 2022-12-12 | Stop reason: SDUPTHER

## 2022-12-12 RX ORDER — ZOLPIDEM TARTRATE 5 MG/1
5 TABLET ORAL NIGHTLY PRN
Status: DISCONTINUED | OUTPATIENT
Start: 2022-12-12 | End: 2022-12-13 | Stop reason: HOSPADM

## 2022-12-12 RX ORDER — ESCITALOPRAM OXALATE 10 MG/1
10 TABLET ORAL DAILY
Status: DISCONTINUED | OUTPATIENT
Start: 2022-12-12 | End: 2022-12-13 | Stop reason: HOSPADM

## 2022-12-12 RX ORDER — MORPHINE SULFATE 2 MG/ML
2 INJECTION, SOLUTION INTRAMUSCULAR; INTRAVENOUS
Status: DISCONTINUED | OUTPATIENT
Start: 2022-12-12 | End: 2022-12-13 | Stop reason: HOSPADM

## 2022-12-12 RX ORDER — ACETAMINOPHEN 325 MG/1
650 TABLET ORAL EVERY 6 HOURS
Status: DISCONTINUED | OUTPATIENT
Start: 2022-12-12 | End: 2022-12-13 | Stop reason: HOSPADM

## 2022-12-12 RX ADMIN — GABAPENTIN 100 MG: 100 CAPSULE ORAL at 13:16

## 2022-12-12 RX ADMIN — BUPIVACAINE HYDROCHLORIDE IN DEXTROSE 12 MG: 7.5 INJECTION, SOLUTION SUBARACHNOID at 14:35

## 2022-12-12 RX ADMIN — CEFAZOLIN 2000 MG: 2 INJECTION, POWDER, FOR SOLUTION INTRAMUSCULAR; INTRAVENOUS at 14:38

## 2022-12-12 RX ADMIN — PROPOFOL 80 MCG/KG/MIN: 10 INJECTION, EMULSION INTRAVENOUS at 14:36

## 2022-12-12 RX ADMIN — ASPIRIN 81 MG: 81 TABLET, COATED ORAL at 20:15

## 2022-12-12 RX ADMIN — ACETAMINOPHEN 1000 MG: 500 TABLET ORAL at 13:16

## 2022-12-12 RX ADMIN — ACETAMINOPHEN 650 MG: 325 TABLET ORAL at 17:26

## 2022-12-12 RX ADMIN — OXYCODONE 10 MG: 5 TABLET ORAL at 20:16

## 2022-12-12 RX ADMIN — SODIUM CHLORIDE, POTASSIUM CHLORIDE, SODIUM LACTATE AND CALCIUM CHLORIDE 1000 ML: 600; 310; 30; 20 INJECTION, SOLUTION INTRAVENOUS at 13:20

## 2022-12-12 RX ADMIN — CEFAZOLIN 2000 MG: 1 INJECTION, POWDER, FOR SOLUTION INTRAMUSCULAR; INTRAVENOUS at 21:58

## 2022-12-12 RX ADMIN — KETOROLAC TROMETHAMINE 30 MG: 30 INJECTION, SOLUTION INTRAMUSCULAR; INTRAVENOUS at 17:26

## 2022-12-12 RX ADMIN — GABAPENTIN 100 MG: 100 CAPSULE ORAL at 20:15

## 2022-12-12 RX ADMIN — TRANEXAMIC ACID 1000 MG: 10 INJECTION, SOLUTION INTRAVENOUS at 15:45

## 2022-12-12 RX ADMIN — ZOLPIDEM TARTRATE 5 MG: 5 TABLET ORAL at 22:03

## 2022-12-12 RX ADMIN — MIDAZOLAM HYDROCHLORIDE 5 MG: 2 INJECTION, SOLUTION INTRAMUSCULAR; INTRAVENOUS at 14:14

## 2022-12-12 RX ADMIN — OXYCODONE HYDROCHLORIDE 10 MG: 10 TABLET, FILM COATED, EXTENDED RELEASE ORAL at 13:15

## 2022-12-12 RX ADMIN — TRANEXAMIC ACID 1000 MG: 10 INJECTION, SOLUTION INTRAVENOUS at 14:46

## 2022-12-12 RX ADMIN — KETOROLAC TROMETHAMINE 30 MG: 30 INJECTION, SOLUTION INTRAMUSCULAR; INTRAVENOUS at 23:35

## 2022-12-12 RX ADMIN — CELECOXIB 200 MG: 100 CAPSULE ORAL at 13:16

## 2022-12-12 RX ADMIN — SODIUM CHLORIDE, POTASSIUM CHLORIDE, SODIUM LACTATE AND CALCIUM CHLORIDE: 600; 310; 30; 20 INJECTION, SOLUTION INTRAVENOUS at 17:27

## 2022-12-12 RX ADMIN — ACETAMINOPHEN 650 MG: 325 TABLET ORAL at 23:35

## 2022-12-12 ASSESSMENT — PAIN SCALES - GENERAL
PAINLEVEL_OUTOF10: 0
PAINLEVEL_OUTOF10: 6
PAINLEVEL_OUTOF10: 0

## 2022-12-12 ASSESSMENT — PAIN DESCRIPTION - ORIENTATION: ORIENTATION: LEFT

## 2022-12-12 ASSESSMENT — PAIN - FUNCTIONAL ASSESSMENT
PAIN_FUNCTIONAL_ASSESSMENT: PREVENTS OR INTERFERES SOME ACTIVE ACTIVITIES AND ADLS
PAIN_FUNCTIONAL_ASSESSMENT: 0-10

## 2022-12-12 ASSESSMENT — PAIN DESCRIPTION - DESCRIPTORS: DESCRIPTORS: CRUSHING

## 2022-12-12 ASSESSMENT — PAIN DESCRIPTION - LOCATION: LOCATION: KNEE

## 2022-12-12 ASSESSMENT — LIFESTYLE VARIABLES
HOW MANY STANDARD DRINKS CONTAINING ALCOHOL DO YOU HAVE ON A TYPICAL DAY: PATIENT DOES NOT DRINK
HOW OFTEN DO YOU HAVE A DRINK CONTAINING ALCOHOL: NEVER

## 2022-12-12 NOTE — ANESTHESIA PRE PROCEDURE
Department of Anesthesiology  Preprocedure Note       Name:  Gayle Colon   Age:  59 y.o.  :  1965                                          MRN:  [id number]         Date:  2022      Surgeon: Aleena Patterson):  Chandler Roberts MD    Procedure: Procedure(s):  LEFT TOTAL KNEE  ARTHROPLASTY REPLACEMENT, KHAI TRIATHLON TOTAL KNEE SYSTEM, CHOICE WITH ADDUCTOR CANAL BLOCK , PAT WITH DAN    Medications prior to admission:   Prior to Admission medications    Medication Sig Start Date End Date Taking? Authorizing Provider   chlorhexidine (HIBICLENS) 4 % external liquid Apply topically daily for 3 days prior to surgery. 22   ESTEPHANIA Pena   Misc. Devices (BATH/SHOWER SEAT) MISC Dispense 1 Shower/Bath seat 10/28/22   Chandler Roberts MD   Misc. Devices (CLASSICS ROLLING WALKER) MISC Dispense 1 rolling walker 10/28/22   Chandler Roberts MD   Misc. Devices (RAISED TOILET SEAT) MISC Dispense 1 raised toilet seat 10/28/22   Chandler Roberts MD   celecoxib (CELEBREX) 200 MG capsule Take 200 mg by mouth daily 2/11/22 8/10/22  Historical Provider, MD   gabapentin (NEURONTIN) 100 MG capsule take 1 capsule by mouth at bedtime 22   Historical Provider, MD   diclofenac (VOLTAREN) 50 MG EC tablet Take 1 tablet by mouth 2 times daily (with meals) 22   Chandler Roberts MD   methylPREDNISolone (MEDROL DOSEPACK) 4 MG tablet On days 1-6 take as directed on package for first 6-day course. 2/10/22   Chandler Roberts MD   zolpidem (AMBIEN CR) 12.5 MG CR tablet Take 12.5 mg by mouth 16   Historical Provider, MD   escitalopram (LEXAPRO) 10 MG tablet Take 10 mg by mouth 8/27/15   Historical Provider, MD       Current medications:    No current facility-administered medications for this encounter. Allergies:  No Known Allergies    Problem List:  There is no problem list on file for this patient.       Past Medical History:        Diagnosis Date    Insomnia        Past Surgical History:        Procedure Laterality Date    KNEE SURGERY         Social History:    Social History     Tobacco Use    Smoking status: Former     Packs/day: 1.00     Years: 10.00     Pack years: 10.00     Types: Cigarettes     Quit date: 2012     Years since quitting: 10.9    Smokeless tobacco: Never   Substance Use Topics    Alcohol use: Yes                                Counseling given: Not Answered      Vital Signs (Current): There were no vitals filed for this visit. BP Readings from Last 3 Encounters:   12/06/22 139/80   07/19/16 110/60   06/07/16 108/76       NPO Status:                                                                                 BMI:   Wt Readings from Last 3 Encounters:   12/06/22 142 lb 8 oz (64.6 kg)   06/30/22 137 lb (62.1 kg)   02/10/22 137 lb (62.1 kg)     There is no height or weight on file to calculate BMI.    CBC:   Lab Results   Component Value Date/Time    WBC 6.9 12/07/2022 03:36 PM    RBC 4.52 12/07/2022 03:36 PM    HGB 12.1 12/07/2022 03:36 PM    HCT 37.3 12/07/2022 03:36 PM    MCV 82.4 12/07/2022 03:36 PM    RDW 12.8 12/07/2022 03:36 PM     12/07/2022 03:36 PM       CMP:   Lab Results   Component Value Date/Time     12/07/2022 03:36 PM    K 4.2 12/07/2022 03:36 PM     12/07/2022 03:36 PM    CO2 28 12/07/2022 03:36 PM    BUN 18 12/07/2022 03:36 PM    CREATININE 0.63 12/07/2022 03:36 PM    LABGLOM >60.0 12/07/2022 03:36 PM    GLUCOSE 97 12/07/2022 03:36 PM    CALCIUM 9.7 12/07/2022 03:36 PM       POC Tests: No results for input(s): POCGLU, POCNA, POCK, POCCL, POCBUN, POCHEMO, POCHCT in the last 72 hours.     Coags:   Lab Results   Component Value Date/Time    PROTIME 13.0 12/07/2022 03:36 PM    INR 1.0 12/07/2022 03:36 PM       HCG (If Applicable): No results found for: PREGTESTUR, PREGSERUM, HCG, HCGQUANT     ABGs: No results found for: PHART, PO2ART, LIQ0XHZ, DXE0SRS, BEART, D1WOPAKO     Type & Screen (If Applicable):  No results found for: Migdalia Libby    Drug/Infectious Status (If Applicable):  No results found for: HIV, HEPCAB    COVID-19 Screening (If Applicable): No results found for: COVID19        Anesthesia Evaluation  Patient summary reviewed and Nursing notes reviewed no history of anesthetic complications:   Airway: Mallampati: II  TM distance: >3 FB   Neck ROM: full  Mouth opening: > = 3 FB   Dental: normal exam         Pulmonary:Negative Pulmonary ROS                              Cardiovascular:Negative CV ROS             Beta Blocker:  Not on Beta Blocker         Neuro/Psych:   Negative Neuro/Psych ROS              GI/Hepatic/Renal: Neg GI/Hepatic/Renal ROS            Endo/Other: Negative Endo/Other ROS                    Abdominal:             Vascular: Other Findings:           Anesthesia Plan      spinal     ASA 2     (Ipack and saphenous nerve blocks)      MIPS: Postoperative opioids intended and Prophylactic antiemetics administered. Anesthetic plan and risks discussed with patient.         Attending anesthesiologist reviewed and agrees with Lino Cano MD   12/12/2022

## 2022-12-12 NOTE — ANESTHESIA PROCEDURE NOTES
Peripheral Block    Patient location during procedure: pre-op  Reason for block: post-op pain management and at surgeon's request  Start time: 12/12/2022 2:19 PM  End time: 12/12/2022 2:22 PM  Staffing  Performed: anesthesiologist   Anesthesiologist: Zain Vega MD  Preanesthetic Checklist  Completed: patient identified, IV checked, site marked, risks and benefits discussed, surgical/procedural consents, equipment checked, pre-op evaluation, timeout performed, anesthesia consent given, oxygen available and monitors applied/VS acknowledged  Peripheral Block   Patient position: supine  Prep: ChloraPrep  Provider prep: mask and sterile gloves  Patient monitoring: cardiac monitor, continuous pulse ox, frequent blood pressure checks and IV access  Block type: Saphenous  Laterality: left  Injection technique: single-shot  Guidance: ultrasound guided and Patient supine with leg externally rotated. Subsartorial technique with lateral, in-plane approach  Local infiltration: lidocaine  Infiltration strength: 1 %  Local infiltration: lidocaine  Dose: 2 mL    Needle   Needle type: combined needle/nerve stimulator   Needle gauge: 21 G  Needle localization: ultrasound guidance (Subsartorial, IP approach, probe cover employed)  Needle length: 10 cm  Assessment   Injection assessment: negative aspiration for heme, no paresthesia on injection and local visualized surrounding nerve on ultrasound  Paresthesia pain: none  Slow fractionated injection: yes  Hemodynamics: stable  Real-time US image taken/store: yes  Outcomes: uncomplicated and patient tolerated procedure well    Additional Notes  Total of 15cc of 0.5% ropivicaine with 5mg decadron injected.

## 2022-12-12 NOTE — ANESTHESIA POSTPROCEDURE EVALUATION
Department of Anesthesiology  Postprocedure Note    Patient: Xena Carter  MRN: [id number]  YOB: 1965  Date of evaluation: 12/12/2022      Procedure Summary     Date: 12/12/22 Room / Location: [facility] OR [address] [place] [address]    Anesthesia Start: 5696 Anesthesia Stop: 6147    Procedure: LEFT TOTAL KNEE  ARTHROPLASTY REPLACEMENT, KHAI TRIATHLON TOTAL KNEE SYSTEM, CHOICE WITH ADDUCTOR CANAL BLOCK , PAT WITH LOPEZ (Left: Knee) Diagnosis:       Primary osteoarthritis of left knee      (PRIMARY OSTEOARTHRITIS OF LEFT KNEE)    Surgeons: Hira Herrera MD Responsible Provider: Zeina Nava MD    Anesthesia Type: spinal ASA Status: 2          Anesthesia Type: No value filed.     Darek Phase I:      Darek Phase II:        Anesthesia Post Evaluation    Patient location during evaluation: PACU  Patient participation: complete - patient participated  Level of consciousness: awake and alert  Pain score: 0  Airway patency: patent  Nausea & Vomiting: no nausea and no vomiting  Complications: no  Cardiovascular status: blood pressure returned to baseline and hemodynamically stable  Respiratory status: acceptable  Hydration status: euvolemic

## 2022-12-12 NOTE — H&P
The history and physical in the electronic medical record dated 12/6/22 was personally reviewed. There are no interval changes that need to be made. Plan is to proceed with a left total knee as scheduled.

## 2022-12-12 NOTE — OP NOTE
Operative Note      Patient: Codie Oliveira  YOB: 1965  MRN: [id number]    Date of Procedure: 12/12/2022    Pre-Op Diagnosis: PRIMARY OSTEOARTHRITIS OF LEFT KNEE    Post-Op Diagnosis: Same       Procedure(s):  LEFT TOTAL KNEE  ARTHROPLASTY REPLACEMENT, KHAI TRIATHLON TOTAL KNEE SYSTEM, CHOICE WITH ADDUCTOR CANAL BLOCK , PAT WITH LOPEZ    Surgeon(s):  Dunia Varma MD    Assistant:   First Assistant: Vilma Ramirez    Anesthesia: Choice    Estimated Blood Loss (mL): less than 422     Complications: None    Specimens:   * No specimens in log *    Implants:  Implant Name Type Inv. Item Serial No.  Lot No. LRB No. Used Action   COMPONENT PAT [id number] SUPERIOR/INFERIOR KNEE - [id number]  COMPONENT PAT [id number] SUPERIOR/INFERIOR KNEE  [employer]  Left 1 Implanted   COMPONENT FEM SZ 4 L KNEE CRUCE RET CEMENTLESS BEAD W/ GINGER - [id number]  COMPONENT FEM SZ 4 L KNEE CRUCE RET CEMENTLESS BEAD W/ GINGER  [employer] [id number] Left 1 Implanted   BASEPLATE TIB SZ 4 [id number] ML70MM KNEE TRITANIUM 4 CRUCFRM - [id number]  BASEPLATE TIB SZ 4 [id number] ML70MM KNEE TRITANIUM 4 CRUCFRM  KHAI [employer] [id number] Left 1 Implanted   INSERT TIB CS 4 10 MM ARTC POST KNEE BEAR TECHNOLOGY X3 - [id number]  INSERT TIB CS 4 10 MM ARTC POST KNEE BEAR TECHNOLOGY X3  [employer] [id number] Left 1 Implanted         Drains: * No LDAs found *    Findings: OA left knee, medial and grade 4 patellofemoral    Detailed Description of Procedure:   Patient was brought to the operating room. After adequate induction of spinal anesthesia by anesthesiology patient was placed supine on the operating table. Tourniquet was applied to the left upper thigh. The left lower extremity was prepped and draped in sterile fashion with a ChloraPrep scrub. The limb was exsanguinated tourniquet was insufflated. Midline approach to the knee was undertaken.   Sharp dissection was carried out through skin subcutaneous tissue down the level of the extensor mechanism. Median parapatellar arthrotomy was then performed. Appropriate soft tissue releases were performed. Synovectomy was performed. Patella was everted and cut via freehand technique. It was sized to a 32. The holes were drilled through the guide. The patella was subluxed laterally and the knee was flexed up. The anterior cruciate ligament as well as the medial and lateral menisci were sacrificed. Femoral canal was opened in retrograde fashion. Intramedullary alignment guide was inserted in. It was pinned in the place. Distal femoral resection was set for 5 degrees valgus cut 8 mm resection. Distal femoral resection was then performed. The tibia was subluxed anteriorly. The tibial extra medullary alignment guide was brought up and pinned in the appropriate position cut down at the lowest level of the erosion medially. Proximal tibial osteotomy was performed and the bone fragment was removed. The spacer was excepted in extension. The pins were removed from the tibia. The remainder of the meniscus was removed, and the tourniquet was let down. Femoral rotation guide was then inserted into the knee with the knee flexed at 90 degrees. It read 1.5 degrees of external rotation. The femoral  sizing guide was set at 1.5 degrees of external rotation and brought up and placed on the distal femur. Femur was sized to a size 4. The holes were drilled through the guide. The 4-in-1 cutting block was impacted in the place. Anterior posterior and chamfer cuts were then made. All bone fragments were removed. Posterior femoral osteophytes were removed. Size  4 tibia with a 10 polyethylene liner and a size 4 femur was then tried. The knee extended fully and flex well. There was no varus or valgus laxity throughout the entire arc of motion. The decision was made proceed with the size components.   The tibial component was pinned in the appropriate mount of external rotation. Lug holes were drilled in the femur. Femoral trial and polyethylene trial were removed. Pain cocktail was injected in the posterior aspect of the knee. The tibia was then subluxed anteriorly and the keel was prepared in the tibia. The 4 hole box was then impacted in the place and the drill holes were made into the tibia. Drill holes were made into the sclerotic bone. The tibial component was impacted into place. It was placed in press-fit fashion. Excellent fixation was obtained. The size 10 polyethylene liner was then impacted into the tibial tray thereby engaging the locking mechanism. The femoral component was then impacted in the place. Once again, excellent fixation was obtained. The patellar component was then placed in press-fit fashion. The knee was taken through range of motion. It tracked very nicely. The patella tracked nicely in the sulcus. There was no varus or valgus laxity throughout the entire arc of motion. The decision was made to proceed with closure. The pain cocktail was injected into the soft tissues. The knee was copiously irrigated out with normal saline from the pulse lavage. Hemostasis was obtained with the Bovie electrocautery. The knee was copiously irrigated out with normal saline from the pulse lavage. Decision was made proceed with closure. Sponge and instrument counts were correct. Knee was copiously irrigated out with normal saline from the pulse lavage. The arthrotomy was closed with 1 Ethibond suture. The remaining incision was closed in layers. Sterile dressing was applied and patient was stable to the PACU. Postoperative management: Patient is to be weightbearing as tolerated. She will receive 24 hours of appropriate antibiotic coverage. She will receive BID aspirin for DVT prophylaxis.  She will receive appropriate pain medications    Electronically signed by Rommel Church MD on 12/12/2022 at 3:39 PM

## 2022-12-12 NOTE — ANESTHESIA PROCEDURE NOTES
Peripheral Block    Patient location during procedure: pre-op  Reason for block: post-op pain management and at surgeon's request  Start time: 12/12/2022 2:14 PM  End time: 12/12/2022 2:19 PM  Staffing  Performed: anesthesiologist   Anesthesiologist: Leticia Sr MD  Preanesthetic Checklist  Completed: patient identified, IV checked, site marked, risks and benefits discussed, surgical/procedural consents, equipment checked, pre-op evaluation, timeout performed, anesthesia consent given, oxygen available and monitors applied/VS acknowledged  Peripheral Block   Patient position: supine  Prep: ChloraPrep  Provider prep: mask and sterile gloves  Patient monitoring: cardiac monitor, continuous pulse ox, frequent blood pressure checks and IV access  Block type: Sciatic and iPacks  Laterality: left  Injection technique: single-shot  Guidance: ultrasound guided and Patient supine with leg elevated. Lateral, IP approach, probe cover employed  Local infiltration: lidocaine  Infiltration strength: 1 %  Local infiltration: lidocaine  Dose: 3 mL    Needle   Needle type: combined needle/nerve stimulator   Needle gauge: 21 G  Needle localization: ultrasound guidance  Needle length: 10 cm  Assessment   Injection assessment: negative aspiration for heme, no paresthesia on injection and local visualized surrounding nerve on ultrasound  Paresthesia pain: none  Slow fractionated injection: yes  Hemodynamics: stable  Real-time US image taken/store: yes  Outcomes: uncomplicated and patient tolerated procedure well    Additional Notes  Total of 15cc of 0.5% ropivicaine with 5mg decadron injected.

## 2022-12-12 NOTE — PROGRESS NOTES
Pt just up from operation to L knee, LE still numb and barely moving. PT eval will be done tomorrow AM, educated pt about PT and OT eval tomorrow morning and that can get up with RN if up to it when LE feeling and moving later this evening.      Anabel Lind, PT     [phone]= 5 minutes

## 2022-12-13 VITALS
TEMPERATURE: 98.1 F | OXYGEN SATURATION: 95 % | SYSTOLIC BLOOD PRESSURE: 121 MMHG | DIASTOLIC BLOOD PRESSURE: 72 MMHG | RESPIRATION RATE: 18 BRPM | WEIGHT: 140 LBS | HEART RATE: 63 BPM | BODY MASS INDEX: 24.8 KG/M2 | HEIGHT: 63 IN

## 2022-12-13 PROBLEM — Z96.652 STATUS POST TOTAL KNEE REPLACEMENT, LEFT: Status: ACTIVE | Noted: 2022-12-13

## 2022-12-13 LAB
ANION GAP SERPL CALCULATED.3IONS-SCNC: 10 MEQ/L (ref 9–15)
BASOPHILS ABSOLUTE: 0 K/UL (ref 0–0.1)
BASOPHILS RELATIVE PERCENT: 0.1 % (ref 0.1–1.2)
BUN BLDV-MCNC: 20 MG/DL (ref 6–20)
CALCIUM SERPL-MCNC: 9.3 MG/DL (ref 8.5–9.9)
CHLORIDE BLD-SCNC: 102 MEQ/L (ref 95–107)
CO2: 27 MEQ/L (ref 20–31)
CREAT SERPL-MCNC: 0.56 MG/DL (ref 0.5–0.9)
EOSINOPHILS ABSOLUTE: 0 K/UL (ref 0–0.4)
EOSINOPHILS RELATIVE PERCENT: 0 % (ref 0.7–5.8)
GFR SERPL CREATININE-BSD FRML MDRD: >60 ML/MIN/{1.73_M2}
GLUCOSE BLD-MCNC: 134 MG/DL (ref 70–99)
HCT VFR BLD CALC: 36 % (ref 37–47)
HEMOGLOBIN: 11.6 G/DL (ref 11.2–15.7)
IMMATURE GRANULOCYTES #: 0.1 K/UL
IMMATURE GRANULOCYTES %: 0.4 %
LYMPHOCYTES ABSOLUTE: 0.9 K/UL (ref 1.2–3.7)
LYMPHOCYTES RELATIVE PERCENT: 6.7 %
MCH RBC QN AUTO: 26.6 PG (ref 25.6–32.2)
MCHC RBC AUTO-ENTMCNC: 32.2 % (ref 32.2–35.5)
MCV RBC AUTO: 82.6 FL (ref 79.4–94.8)
MONOCYTES ABSOLUTE: 0.6 K/UL (ref 0.2–0.9)
MONOCYTES RELATIVE PERCENT: 4.6 % (ref 4.7–12.5)
NEUTROPHILS ABSOLUTE: 12.4 K/UL (ref 1.6–6.1)
NEUTROPHILS RELATIVE PERCENT: 88.2 % (ref 34–71.1)
PDW BLD-RTO: 12.2 % (ref 11.7–14.4)
PLATELET # BLD: 263 K/UL (ref 182–369)
POTASSIUM SERPL-SCNC: 4.8 MEQ/L (ref 3.4–4.9)
RBC # BLD: 4.36 M/UL (ref 3.93–5.22)
SODIUM BLD-SCNC: 139 MEQ/L (ref 135–144)
WBC # BLD: 14 K/UL (ref 4–10)

## 2022-12-13 PROCEDURE — 97166 OT EVAL MOD COMPLEX 45 MIN: CPT

## 2022-12-13 PROCEDURE — 36415 COLL VENOUS BLD VENIPUNCTURE: CPT

## 2022-12-13 PROCEDURE — 97530 THERAPEUTIC ACTIVITIES: CPT

## 2022-12-13 PROCEDURE — 6360000002 HC RX W HCPCS: Performed by: ORTHOPAEDIC SURGERY

## 2022-12-13 PROCEDURE — 97116 GAIT TRAINING THERAPY: CPT

## 2022-12-13 PROCEDURE — 6370000000 HC RX 637 (ALT 250 FOR IP): Performed by: ORTHOPAEDIC SURGERY

## 2022-12-13 PROCEDURE — 97110 THERAPEUTIC EXERCISES: CPT

## 2022-12-13 PROCEDURE — 85025 COMPLETE CBC W/AUTO DIFF WBC: CPT

## 2022-12-13 PROCEDURE — 80048 BASIC METABOLIC PNL TOTAL CA: CPT

## 2022-12-13 PROCEDURE — G0378 HOSPITAL OBSERVATION PER HR: HCPCS

## 2022-12-13 PROCEDURE — 2580000003 HC RX 258: Performed by: ORTHOPAEDIC SURGERY

## 2022-12-13 PROCEDURE — 97161 PT EVAL LOW COMPLEX 20 MIN: CPT

## 2022-12-13 RX ORDER — MELOXICAM 15 MG/1
15 TABLET ORAL DAILY
Qty: 30 TABLET | Refills: 1 | Status: SHIPPED | OUTPATIENT
Start: 2022-12-13 | End: 2023-01-12

## 2022-12-13 RX ORDER — DOCUSATE SODIUM 100 MG/1
100 CAPSULE, LIQUID FILLED ORAL 2 TIMES DAILY PRN
Qty: 14 CAPSULE | Refills: 1 | Status: SHIPPED | OUTPATIENT
Start: 2022-12-13 | End: 2023-01-12

## 2022-12-13 RX ORDER — OXYCODONE HYDROCHLORIDE 5 MG/1
5 TABLET ORAL EVERY 6 HOURS PRN
Qty: 28 TABLET | Refills: 0 | Status: SHIPPED | OUTPATIENT
Start: 2022-12-13 | End: 2022-12-20

## 2022-12-13 RX ORDER — ASPIRIN 81 MG/1
81 TABLET ORAL 2 TIMES DAILY
Qty: 56 TABLET | Refills: 0 | Status: SHIPPED | OUTPATIENT
Start: 2022-12-13

## 2022-12-13 RX ORDER — ACETAMINOPHEN 500 MG
1000 TABLET ORAL 3 TIMES DAILY
Qty: 42 TABLET | Refills: 1 | Status: SHIPPED | OUTPATIENT
Start: 2022-12-13

## 2022-12-13 RX ADMIN — ACETAMINOPHEN 650 MG: 325 TABLET ORAL at 10:54

## 2022-12-13 RX ADMIN — CEFAZOLIN 2000 MG: 1 INJECTION, POWDER, FOR SOLUTION INTRAMUSCULAR; INTRAVENOUS at 06:04

## 2022-12-13 RX ADMIN — ASPIRIN 81 MG: 81 TABLET, COATED ORAL at 10:19

## 2022-12-13 RX ADMIN — ACETAMINOPHEN 650 MG: 325 TABLET ORAL at 06:02

## 2022-12-13 RX ADMIN — OXYCODONE 10 MG: 5 TABLET ORAL at 10:53

## 2022-12-13 RX ADMIN — KETOROLAC TROMETHAMINE 30 MG: 30 INJECTION, SOLUTION INTRAMUSCULAR; INTRAVENOUS at 06:02

## 2022-12-13 ASSESSMENT — PAIN SCALES - GENERAL
PAINLEVEL_OUTOF10: 7
PAINLEVEL_OUTOF10: 3

## 2022-12-13 ASSESSMENT — PAIN DESCRIPTION - ORIENTATION
ORIENTATION: LEFT
ORIENTATION: LEFT

## 2022-12-13 ASSESSMENT — PAIN DESCRIPTION - LOCATION
LOCATION: KNEE
LOCATION: KNEE

## 2022-12-13 ASSESSMENT — PAIN DESCRIPTION - PAIN TYPE: TYPE: SURGICAL PAIN

## 2022-12-13 ASSESSMENT — PAIN DESCRIPTION - DESCRIPTORS: DESCRIPTORS: THROBBING;ACHING

## 2022-12-13 NOTE — DISCHARGE INSTRUCTIONS
Dr. Manjit Alvarado., MD  [facility]    Post Operative Instructions for Total Knee Replacement    Incision and dressing  Your incision is covered with a waterproof Aquacel dressing. It has been impregnated with silver nitrate to help lorenz off infection. The dressing is to be removed 7 days after the date of your surgery. The incision has been closed with skin glue. The glue will flake off over time and fall off on its own. DO NOT apply any lotions, creams, peroxide or Betadine to the skin glue, as it will degrade and dissolve the glue. DO NOT peel the glue off, as this could result in opening of the incision. There are no sutures that need to be removed; the skin and subcutaneous layers have been closed with dissolvable sutures, which will dissolve over 7 to 8 weeks. Showering  The Aquacel dressing is waterproof. You may shower when you feel ready. Once the Aquacel dressing has been removed, you may continue to shower. The glue provides a waterproof seal to the incision. Let the water run over the incision, and pat dry with a towel. Do not scrub or rub the glue. Compression stockings  The compression stockings/MAGDI hose are used to help reduce swelling and assist in the prevention of blood clots. They are to be worn on the operative leg for 3 weeks. They should be worn full-time during the day, but may be removed at nighttime or during periods of elevation during the day. They are optional on the nonoperative leg, depending on how much swelling may be encountered. Activity  You will begin activity immediately after surgery. Physical therapy will commence (at home or as an outpatient) within a few days of surgery. You may bear weight on the operative leg as tolerated. It is encouraged that you get up for short walks frequently throughout the day.   Pump your ankles up and down at least 10 times every hour while you are awake, or if you are standing, stand on your toes 10 times every hour while you are awake. The muscle contractions will assist in moving the blood and in the prevention of blood clots. Rest and elevate your leg frequently throughout the day. Proper elevation aims to return fluid and edema to the heart. That is, the foot should be above the knee, the knee should be above the hip, and the hip should be above the heart. This can be accomplished by placing a pillow under your rear end, and propping the leg up on the back of a couch or on a wall. Ice your incision frequently -  20 minutes every hour for the first few days and then as needed to assist with swelling. You will begin walking with a walker. Physical therapy will help you progress from a walker to a cane and then to no assistive devices as you progress through your therapy. Medications  You will need to take aspirin 81 mg twice a day for 4 weeks. This is to help prevent the formation of blood clots. You will receive 3 medications to assist in the management of your pain. The medications all work in different fashions, attacking the pain from 3 different directions:  Tylenol - you will take 1000 mg of Tylenol 3 times a day  Meloxicam - this is an anti-inflammatory; you will take this once a day  Oxycodone -this is a narcotic pain medication; you will take this medication as needed for breakthrough pain control. For the first few days after surgery, it is recommended to take the oxycodone around-the-clock. It is easier to stay on top of the pain than it is to play catch up with the pain. As your pain improves, you may cut back on the oxycodone. You may then wean off the Tylenol and Meloxicam thereafter. The pain medications may be discontinued when you feel they are no longer necessary. Take pain medications as directed; taking more than as directed can be dangerous and have negative side effects.   An over-the-counter stool softener such as Colace or Dulcolax as recommended his pain medications can lead to constipation. Take this as needed until your bowel function is normal.    Follow-up  You will have a follow-up appointment with Dr. Yuridia Boo approximately 3 weeks after the date of your surgery. CALL THE OFFICE ([phone]) if:  You run a fever of 100 degrees or higher that will not subside with Tylenol. You noticed drainage from the incision. You have worsening swelling or pain that is not relieved by rest or medication.

## 2022-12-13 NOTE — PLAN OF CARE
Problem: Discharge Planning  Goal: Discharge to home or other facility with appropriate resources  Outcome: Progressing  Flowsheets (Taken 12/12/2022 1835 by Mirza Leach RN)  Discharge to home or other facility with appropriate resources:   Identify barriers to discharge with patient and caregiver   Identify discharge learning needs (meds, wound care, etc)   Refer to discharge planning if patient needs post-hospital services based on physician order or complex needs related to functional status, cognitive ability or social support system     Problem: Pain  Goal: Verbalizes/displays adequate comfort level or baseline comfort level  Outcome: Progressing     Problem: Safety - Adult  Goal: Free from fall injury  Outcome: Progressing     Problem: ABCDS Injury Assessment  Goal: Absence of physical injury  Outcome: Progressing

## 2022-12-13 NOTE — PROGRESS NOTES
Assumed patient care. She is awake and resting in bed with TV on. A&O  x 4. Pleasant and cooperative. LLE neurovascular checks are WNL. IVF infusing as ordered. No complaints voiced. No s/s distress noted.

## 2022-12-13 NOTE — CONSULTS
Hospital Medicine  History and Physical    Patient:  Luvenia Duverney  MRN: [id number]    CHIEF COMPLAINT:  No chief complaint on file. History Obtained From:  Patient, EMR  Primary Care Physician: Tiago Tolbert MD    HISTORY OF PRESENT ILLNESS:   The patient is a 59 y.o. female with no significant past medical history. She underwent left knee replacement by Dr. Adelina Welch. Patient is doing well. Minimal pain and discomfort. No chest pain or palpitation. No abdominal pain, nausea, vomiting, diarrhea, dysuria hematuria. No headaches, loss of consciousness or seizure    Past Medical History:      Diagnosis Date    Insomnia        Past Surgical History:      Procedure Laterality Date    KNEE SURGERY         Medications Prior to Admission:    Prior to Admission medications    Medication Sig Start Date End Date Taking? Authorizing Provider   oxyCODONE (ROXICODONE) 5 MG immediate release tablet Take 1 tablet by mouth every 6 hours as needed for Pain for up to 7 days. 12/13/22 12/20/22 Yes Bethany Arredondo MD   acetaminophen (TYLENOL) 500 MG tablet Take 2 tablets by mouth 3 times daily 12/13/22  Yes Bethany Arredondo MD   meloxicam [facility]) 15 MG tablet Take 1 tablet by mouth daily 12/13/22 1/12/23 Yes Bethany Arredondo MD   docusate sodium (COLACE) 100 MG capsule Take 1 capsule by mouth 2 times daily as needed for Constipation 12/13/22 1/12/23 Yes Btehany Arredondo MD   aspirin 81 MG EC tablet Take 1 tablet by mouth 2 times daily 12/13/22  Yes Bethany Arredondo MD   [facility]. Devices (BATH/SHOWER SEAT) MISC Dispense 1 Shower/Bath seat 10/28/22   Bethany Arredondo MD   [facility]. Devices (CLASSICS ROLLING WALKER) MISC Dispense 1 rolling walker 10/28/22   Bethany Arredondo MD   Misc.  Devices (RAISED TOILET SEAT) [facility] Dispense 1 raised toilet seat 10/28/22   Bethany Arredondo MD   gabapentin (NEURONTIN) 100 MG capsule take 1 capsule by mouth at bedtime 6/20/22   Historical Provider, MD   zolpidem (AMBIEN CR) 12.5 MG CR tablet Take 12.5 mg by mouth 6/27/16   Historical Provider, MD   escitalopram (LEXAPRO) 10 MG tablet Take 20 mg by mouth 8/27/15   Historical Provider, MD       Allergies:  Patient has no known allergies. Social History:   TOBACCO:   reports that she quit smoking about 10 years ago. Her smoking use included cigarettes. She has a 10.00 pack-year smoking history. She has never used smokeless tobacco.  ETOH:   reports current alcohol use. Family History:       Problem Relation Age of Onset    Cancer Maternal Grandmother     Breast Cancer Maternal Grandmother        REVIEW OF SYSTEMS:  Ten systems reviewed and negative except for stated in HPI    Physical Exam:    Vitals: /72   Pulse 63   Temp 98.1 °F (36.7 °C) (Oral)   Resp 18   Ht 5' 3\" (1.6 m)   Wt 140 lb (63.5 kg)   LMP 03/10/2016   SpO2 93%   BMI 24.80 kg/m²   General appearance: alert, appears stated age and cooperative, no apparent distress. Awake and oriented. Coherent. Skin: Skin color, texture, turgor normal. No rashes or lesions  HEENT: Head: Normocephalic, no lesions, without obvious abnormality. Neck: no adenopathy, no carotid bruit, no JVD, supple, symmetrical, trachea midline, and thyroid not enlarged, symmetric, no tenderness/mass/nodules  Lungs: clear to auscultation bilaterally  Heart: regular rate and rhythm, S1, S2 normal, no murmur, click, rub or gallop  Abdomen: soft, non-tender; bowel sounds normal; no masses,  no organomegaly  Extremities: extremities normal, atraumatic, no cyanosis or edema left leg is wrapped from the mid thigh down to the ankle. To be inspected by orthopedic team.  Neurologic: Mental status: Alert, oriented, thought content appropriate     Recent Labs     12/13/22  0536   WBC 14.0*   HGB 11.6        Recent Labs     12/13/22  0536      K 4.8      CO2 27   BUN 20   CREATININE 0.56   GLUCOSE 134*     Troponin T: No results for input(s): TROPONINI in the last 72 hours.     ABGs: No results found for: PHART, PO2ART, AHU7KLK  INR: No results for input(s): INR in the last 72 hours. URINALYSIS:No results for input(s): NITRITE, COLORU, PHUR, LABCAST, WBCUA, RBCUA, MUCUS, TRICHOMONAS, YEAST, BACTERIA, CLARITYU, SPECGRAV, LEUKOCYTESUR, UROBILINOGEN, BILIRUBINUR, BLOODU, GLUCOSEU, AMORPHOUS in the last 72 hours. Invalid input(s): Kellen Chisholm  -----------------------------------------------------------------   No results found. Assessment and Plan     *Status post left total knee arthroplasty. All surgical related issues including but not limited to ambulation instructions, pharmacological DVT prophylaxis, pain management, monitoring for wound infection, wound bleed, wound dehiscence, outpatient follow-up are to be addressed by orthopedic team.    *Depression and anxiety, no decompensation. Continue Lexapro. *Osteoarthritis. Patient is to follow-up with the PCP. Thank you for this consultation. Please call me if needed. Patient Active Problem List   Diagnosis Code    Localized osteoarthritis of left knee M17.12       Kedar Sims MD, MD  Admitting Hospitalist    TTS: 85mins where I focused more than 75% of my attention on rendering care, and planning treatment course for this patient, in addition to talking to RN team, mid levels, consulting with other physicians and following up on labs and imaging. High Risk Readmission Screening Tool Score Noted.      Emergency Contact:

## 2022-12-13 NOTE — PROGRESS NOTES
Facility/Department: [facility] MED SURG UNIT  Occupational Therapy Initial Assessment    Name: Akin Taylor  : 1965  MRN: [id number]  Date of Service: 2022    Discharge Recommendations:  Continue to assess pending progress, Home with Home health OT          Patient Diagnosis(es): The encounter diagnosis was Osteoarthritis of left knee, unspecified osteoarthritis type. Past Medical History:  has a past medical history of Insomnia. Past Surgical History:  has a past surgical history that includes knee surgery. Treatment Diagnosis: Decreased ADL/IADL performance d/t L TKR      Assessment   Performance deficits / Impairments: Decreased functional mobility ; Decreased high-level IADLs;Decreased ADL status; Decreased endurance;Decreased balance  Assessment: Pt is a 60 y/o F who underwent L TKR with Dr. Chase Hughes on 22. Pt lives alone in a 2 floor home, bed and bath are on second floor. PLOF is I, pt works full time in construction. Upon arrival, OT answered pt's call button. Pt would like to go back to bed. OT introduced self and role, pt agreeable to shower and clothing change. She requires no hands on assist for bathing, dressing, grooming, functional transfers or mobility. FWW used with pt not relying on device. Pt laid in bed at session conclusion, able to bridge and position self I. She would benefit from home care to ensure safety and ability to manage shower on second floor. Treatment Diagnosis: Decreased ADL/IADL performance d/t L TKR  REQUIRES OT FOLLOW-UP: Yes  Activity Tolerance  Activity Tolerance: Patient Tolerated treatment well        Plan   Occupational Therapy Plan  Times Per Week: 4-7  Times Per Day:  Once a day  Current Treatment Recommendations: Strengthening, Balance training, Functional mobility training, Endurance training, Patient/Caregiver education & training, Self-Care / ADL, Safety education & training, Equipment evaluation, education, & procurement, Home management training Restrictions  Restrictions/Precautions  Restrictions/Precautions: Weight Bearing  Required Braces or Orthoses?: No  Implants present? : Metal implants  Lower Extremity Weight Bearing Restrictions  Left Lower Extremity Weight Bearing: Weight Bearing As Tolerated    Subjective   General  Chart Reviewed: Yes, Progress Notes, History and Physical  Patient assessed for rehabilitation services?: Yes  Response to previous treatment: Patient reporting fatigue but able to participate  Family / Caregiver Present: No  Referring Practitioner: Dr. Sukhdeep Davis  Diagnosis: Pt assessed for skilled OT services following L TKR  Subjective  Subjective: \"I was just about to lay down. \"     Social/Functional History  Social/Functional History  Lives With: Alone  Type of Home: House  Home Layout: Two level, Bed/Bath upstairs  Home Access: Stairs to enter with rails  Entrance Stairs - Number of Steps: 4  Entrance Stairs - Rails: Left  Bathroom Shower/Tub: Tub/Shower unit, Shower chair without back  Bathroom Toilet: Standard  Bathroom Equipment: Shower chair, Hand-held shower  Bathroom Accessibility: Accessible  Home Equipment: Darnella Belch, rolling, Cane  Has the patient had two or more falls in the past year or any fall with injury in the past year?: No  ADL Assistance: Independent  Homemaking Assistance: Independent  Homemaking Responsibilities: Yes  Ambulation Assistance: Independent  Transfer Assistance: Independent  Active : Yes  Mode of Transportation: Car, SUV  Occupation: Full time employment  Type of Occupation: full time construction       Objective   Heart Rate: 63  Heart Rate Source: Monitor  BP: 121/72  BP Location: Right upper arm  BP Method: Automatic  MAP (Calculated): 88  Resp: 18  SpO2: 93 %  O2 Device: None (Room air)          Observation/Palpation  Posture: Good  Observation: IV L UE  Safety Devices  Type of Devices: All fall risk precautions in place;Call light within reach; Left in bed  Restraints  Restraints Initially in Place: No  Balance  Sitting: Intact  Standing: With support (FWW used for standing and mobility tasks, pt able to let go of device and engage BUE in functional tasks without losing balance.)  Transfer Training  Transfer Training: Yes  Overall Level of Assistance: Stand-by assistance (With FWW, no episodes of unsteadiness)  Sit to Stand: Stand-by assistance  Stand to Sit: Stand-by assistance  Toilet Transfer: Supervision     AROM: Within functional limits  Strength: Within functional limits  Coordination: Within functional limits  Tone: Normal  Sensation: Intact  ADL  Feeding: Independent  Grooming: Stand by assistance  Grooming Skilled Clinical Factors: Oral care and pt washed face in stance at sink with FWW in place  UE Bathing: Supervision  LE Bathing: Stand by assistance  UE Dressing: Modified independent   LE Dressing: Supervision  Toileting: Supervision  Additional Comments: Shower and clothing change completed with OT, pt did well with good adherence to precautions and safety. Did not require LB AE for bathing/dressing. Vision  Vision: Within Functional Limits  Hearing  Hearing: Within functional limits  Cognition  Overall Cognitive Status: WNL  Orientation  Overall Orientation Status: Within Normal Limits  Orientation Level: Oriented X4                  Education Given To: Patient  Education Provided: Role of Therapy;Plan of Care; Fall Prevention Strategies;Precautions; ADL Adaptive Strategies;Transfer Training;Equipment  Education Method: Demonstration;Verbal;Teach Back  Barriers to Learning: None  Education Outcome: Verbalized understanding;Demonstrated understanding;Continued education needed  LUE AROM (degrees)  LUE AROM : WNL  RUE AROM (degrees)  RUE AROM : WNL            Goals  Short Term Goals  Time Frame for Short Term Goals: 3-5 days  Short Term Goal 1: Doff/claudia UB/LB clothing Mod I/I  Short Term Goal 2: Bathe UB/LB Mod I/I  Short Term Goal 3: Complete toileting routine Mod I/I  Short Term Goal 4: Complete sinkside grooming 5-10 minutes in stance Mod I/I  Short Term Goal 5: Complete all functional transfers/mobility Mod I/I  Patient Goals   Patient goals :  To return home with home care       Therapy Time   Individual Concurrent Group Co-treatment   Time In  10:35am         Time Out  11:05am         Minutes  [address], [id number]

## 2022-12-13 NOTE — PROGRESS NOTES
AVS printed and explained to pt. Medications and post op instructions reviewed and pt stated understanding. Saline lock removed prior to discharge. All pt belongings gathered by pt and son. Pt left unit via wheelchair with staff and family.

## 2022-12-13 NOTE — PROGRESS NOTES
Facility/Department: [facility] MED SURG UNIT  Daily Treatment Note  NAME: Brendon Mendieta  : 1965  MRN: [id number]    Date of Service: 2022    Discharge Recommendations:  Home with assist PRN, Home with Home health PT        Patient Diagnosis(es): The encounter diagnosis was Osteoarthritis of left knee, unspecified osteoarthritis type. L TKA    Assessment Pt is pleasant, motivated to return home today. Issued and reviewed pictoral copy of seated and supine LE ROM exercises, discussed correct form/technique, recommended pt perform 2-3x/ day to facilitate ROM. Educated pt on importance of progressing mobility and HEP in moderation/as tolerated, advised pt to continue to ice L knee and elevate to assist with decreasing pain/inflammation. Instructed pt to stand/ambulate periodically throughout the day to assist with decreasing muscle tightness, pt verbalizing understanding. Pt does not report any concerns in regard to homegoing/mobility, verbally discussed car transfer. Pt does plan on purchasing walker wheels for AD. Pt in bed at end of session: 7/10 L knee pain, ice applied to assist with pain control/decreasing inflammation. Plan    Physcial Therapy Plan  General Plan: 5-7 times per week  Current Treatment Recommendations: Strengthening;ROM; Functional mobility training;Transfer training;Gait training;Stair training;Neuromuscular re-education; Safety education & training;Home exercise program;Patient/Caregiver education & training;Manual     Restrictions  Restrictions/Precautions  Restrictions/Precautions: Weight Bearing  Required Braces or Orthoses?: No  Implants present? : Metal implants  Lower Extremity Weight Bearing Restrictions  Left Lower Extremity Weight Bearing: Weight Bearing As Tolerated     Subjective    Subjective  Subjective: Pt lying awake in bed, agreeable to PT interventions- MAGDI Cm, ice to L knee  Pain: 6/10 L knee  Orientation  Overall Orientation Status: Within Normal Limits  Orientation Level: Oriented X4  Cognition  Overall Cognitive Status: WNL     Objective   Vitals     Bed Mobility Training  Bed Mobility Training: Yes  Overall Level of Assistance: Modified independent  Balance  Sitting: Intact  Standing: With support (pt requires AD for stablity with dynamic standing activity)  Transfer Training  Transfer Training: Yes  Overall Level of Assistance: Supervision;Modified independent  Sit to Stand: Stand-by assistance  Stand to Sit: Stand-by assistance  Toilet Transfer: Supervision  Gait Training  Gait Training: Yes  Gait  Overall Level of Assistance: Stand-by assistance;Supervision  Interventions: Verbal cues  Gait Abnormalities: Antalgic- step to pattern, heavy WBing through UEs  Distance (ft): 80 Feet  Assistive Device: Other (comment) (standard walker)  Rail Use: Both  Stairs - Level of Assistance: Stand-by assistance  Number of Stairs Trained: 5, pt able to demo safe technique, sequencing; non-reciprocal patttern     PT Exercises  Exercise Treatment: Issued and reviewed pictoral copy of seated and supine LE ROM exercises     Safety Devices  Type of Devices: pt in bed, Call light within reach  Restraints  Restraints Initially in Place: No       Goals  Short Term Goals  Time Frame for Short Term Goals: 3 days  Short Term Goal 1: I bed mobility  Short Term Goal 2: Transfers mod I  Short Term Goal 3: Pt amb 80 feet with walker mod I  Short Term Goal 4: Pt ascend and descend 10 steps with supervsion to allow safe access into home and up to 2nd floor  Short Term Goal 5: I with HEP to further improve ROM and strength  Patient Goals   Patient Goals :  \"To get better so I can walk without limping\"    Education  Patient Education  Education Given To: Patient  Education Provided: Home Exercise Program  Education Provided Comments: cues/instruction for correct technique, recommended frequency  Education Method: Verbal  Barriers to Learning: None  Education Outcome: Verbalized understanding    Therapy Time   Individual Concurrent Group Co-treatment   Time In  12:30 pm         Time Out  1:15 pm         Minutes  [address] PTA

## 2022-12-14 NOTE — PROGRESS NOTES
12/14/22: Chart reviewed. Patient was evaluated by PT/OT and therapies recommending [address] upon DC. Patient was DC home before meeting with this . This  contacted patient via phone to discuss skilled [address] and inquire about help at home needs. Patient reports interest in skilled [address] and has identified [facility] as agency of choice. This  collaborated with [facility] liaison Laura Schwartz whom plans to contact patient to arrange services. No further follow up requested from SS at this time.

## 2022-12-16 ENCOUNTER — TELEPHONE (OUTPATIENT)
Dept: ORTHOPEDIC SURGERY | Age: 57
End: 2022-12-16

## 2022-12-19 ENCOUNTER — TELEPHONE (OUTPATIENT)
Dept: ORTHOPEDIC SURGERY | Age: 57
End: 2022-12-19

## 2022-12-19 DIAGNOSIS — Z96.652 STATUS POST LEFT KNEE REPLACEMENT: Primary | ICD-10-CM

## 2022-12-19 DIAGNOSIS — M17.12 PRIMARY OSTEOARTHRITIS OF LEFT KNEE: ICD-10-CM

## 2022-12-20 RX ORDER — OXYCODONE HYDROCHLORIDE 5 MG/1
5 TABLET ORAL EVERY 8 HOURS PRN
Qty: 21 TABLET | Refills: 0 | Status: SHIPPED | OUTPATIENT
Start: 2022-12-20 | End: 2022-12-27

## 2022-12-23 DIAGNOSIS — M17.12 PRIMARY OSTEOARTHRITIS OF LEFT KNEE: Primary | ICD-10-CM

## 2022-12-27 ENCOUNTER — OFFICE VISIT (OUTPATIENT)
Dept: ORTHOPEDIC SURGERY | Age: 57
End: 2022-12-27

## 2022-12-27 VITALS
OXYGEN SATURATION: 98 % | TEMPERATURE: 97.8 F | HEART RATE: 73 BPM | WEIGHT: 140 LBS | HEIGHT: 63 IN | BODY MASS INDEX: 24.8 KG/M2

## 2022-12-27 DIAGNOSIS — Z96.652 STATUS POST LEFT KNEE REPLACEMENT: Primary | ICD-10-CM

## 2022-12-27 PROCEDURE — 99024 POSTOP FOLLOW-UP VISIT: CPT | Performed by: PHYSICIAN ASSISTANT

## 2022-12-27 RX ORDER — HYDROCODONE BITARTRATE AND ACETAMINOPHEN 5; 325 MG/1; MG/1
1 TABLET ORAL EVERY 6 HOURS PRN
Qty: 28 TABLET | Refills: 0 | Status: SHIPPED | OUTPATIENT
Start: 2022-12-27 | End: 2023-01-03

## 2022-12-27 NOTE — PROGRESS NOTES
Narrative Referring Provider:   Samantha Knight      PCP:   Edwige Wilcox MD    ============================  IMPRESSION/PLAN:  ============================  Annalisa Escalante is s/p left Total knee replacement completed on 2022. IMPRESSION: No complaints or limitations and At normal postoperative stage of recovery    PLAN:  Continue current conservative treatment. , Rest, Ice, Compression, Elevation PRN. , and Continue physical therapy. Routine follow-up. Ice compression and elevation  Patient Reassurance: Normal post-operative course discussed with patient. Patient reassured and supported. All questions answered. Follow up 3 months No X-Rays Needed    Patient presents today for a a routine 1st post-op visit    Status post op:  left Total knee replacement     BMI: There is no height or weight on file to calculate BMI. Post-operative recovery was complicated by uneventful/none. Patient rates their condition as improving. Does the patient still experience pain? 4/10 pain, aching    Post Op discharge patient location: in home. Functional Assessment is as follows: is ready to begin outpatient PT. Functional difficulties: None. Pain Medication: Tylenol, rare oxycodone  Currently Ambulating with: a cane    =================================  EXAM: POST OP KNEE  =================================  Right Post-Operative Knee    Ambulates with a limp favoring the right. SKIN: Appropriate postop appearance, No evidence of erythema, warmth, discharge or drainage and Incision clean/dry/intact. Range of motion is 0 degrees in extension and   90 degrees of flexion. Extension La degrees    Pain with ROM: Yes with deeper flexion    There is Mild effusion.      Mal-alignment: No    Tender to the palpation of Medial femoral condyle and Medial joint line    Neurovascular Status: Sensation Intact, Moves foot and ankle up & down, 2+ dorsalis pedis and negative homans sign    Stability:Varus/Valgus- Yes, stable    Quad strength: improving    Imagin. Implants are well aligned. Implants are well fixed. Patella is well positioned. Patient states the oxycodone is not helping her as much as she would like. I told her to increase her gabapentin to twice a day. Should begin taking Norco up to 4 times daily. She is to follow-up with me to both.   Provider:   ESTEPHANIA Dawson

## 2023-01-04 ENCOUNTER — HOSPITAL ENCOUNTER (OUTPATIENT)
Dept: PHYSICAL THERAPY | Age: 58
Setting detail: THERAPIES SERIES
Discharge: HOME | End: 2023-01-04
Payer: COMMERCIAL

## 2023-01-04 PROCEDURE — 97161 PT EVAL LOW COMPLEX 20 MIN: CPT

## 2023-01-04 PROCEDURE — 97110 THERAPEUTIC EXERCISES: CPT

## 2023-01-04 ASSESSMENT — PAIN DESCRIPTION - DESCRIPTORS: DESCRIPTORS: SORE;ACHING

## 2023-01-04 ASSESSMENT — PAIN DESCRIPTION - ORIENTATION: ORIENTATION: LEFT

## 2023-01-04 ASSESSMENT — PAIN SCALES - GENERAL: PAINLEVEL_OUTOF10: 5

## 2023-01-04 ASSESSMENT — PAIN DESCRIPTION - LOCATION: LOCATION: KNEE

## 2023-01-04 NOTE — PROGRESS NOTES
[address]  PHYSICAL THERAPY PLAN OF CARE   [address]   [facility], [address] [place] [address]         Ph: [phone]  Fax: [phone]    [] Certification  [] Recertification [x]  Plan of Care  [] Progress Note [] Discharge      Referring Provider: Barbara Feliciano MD     From:  Gutierrez Tse, PT, DPT  Patient: Rowdy Vazquez (59 y.o. female) : 1965 Date: 2023  Medical Diagnosis: Primary osteoarthritis of left knee [M17.12]       Treatment Diagnosis: L knee pain, L knee edema, decreased L knee ROM, decreased LLE strength, impaired gait and functional mobility s/p TKA    Progress Report Period from:  2023  to 2023    Visits to Date: 1 No Show: 0 Cancelled Appts: 0    OBJECTIVE:   Long Term Goals - Time Frame for Long Term Goals : 4-6 weeks  Goals Current/ Discharge status Status   Long Term Goal 1: L knee ROM 0-120 w/o pain to improve ability to squat for work duties AROM LLE (degrees)  L Knee Flexion (0-145): 107  L Knee Extension (0): -5     AROM RLE (degrees)  R Knee Flexion (0-145): 130  R Knee Extension (0): 0        New   Long Term Goal 2: Toi LE strength 5/5 to allow reciprocal stair climbing and squatting/kneeling for work duties  Strength LLE  L Hip Flexion: 4/5  L Hip Extension: 4/5  L Hip ABduction: 4/5  L Hip ADduction: 4/5  L Knee Flexion: 4-/5  L Knee Extension: 4-/5  L Ankle Dorsiflexion: 4+/5    Strength RLE  R Hip Flexion: 5/5  R Hip Extension: 5/5  R Hip ABduction: 5/5  R Hip ADduction: 5/5  R Knee Flexion: 5/5  R Knee Extension: 5/5  R Ankle Dorsiflexion: 5/5               New   Long Term Goal 3: Decreased L knee pain to < 2/10 with ADLs and ambulation  Pain Screening  Patient Currently in Pain: Yes  Pain Assessment: 0-10  Pain Level: 5  Pain Location: Knee  Pain Orientation: Left  Pain Descriptors: Sore, Aching  Pain Management/Relieving Factors: Rest, Ice, Medications     New   Long Term Goal 4: 90% PLOF or better via subjective report or functional survey  Exam: LEFS: 33/80     New   Long Term Goal 5: [place] with HEP  HEP started; progressions ongoing   New     Body Structures, Functions, Activity Limitations Requiring Skilled Therapeutic Intervention: Increased pain, Decreased ROM, Decreased strength, Decreased functional mobility , Decreased balance, Decreased high-level IADLs  Assessment: Pt is a 58 yo female s/p L TKA 12/12/22. Pt presents with impaired gait and mobility secondary to L knee pain, decreased L knee ROM, L knee edema, and decreased LLE strength. 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 Therapy Prognosis: Good      PT Education: PT Role;Plan of Care;Goals; Evaluative findings;Home Exercise Program    PLAN: [x] Evaluate and Treat  Frequency/Duration:  Plan Frequency: 1-2  Plan weeks: 4-6  Current Treatment Recommendations: ROM, Strengthening, Balance training, Functional mobility training, Gait training, Stair training, Neuromuscular re-education, Manual, Pain management, Home exercise program, Safety education & training, Patient/Caregiver education & training, Equipment evaluation, education, & procurement, Modalities  Modalities: Heat/Cold, Vasopneumatic Device                       Patient Status:[x] Continue/ Initiate plan of Care     [] Discharge PT. Recommend pt continue with HEP. [] Additional visits requested, Please re-certify for additional visits:     [] Hold        Signature: Electronically signed by Daniel Smyth PT on 1/4/23 at 12:23 PM EST      If you have any questions or concerns, please don't hesitate to call. Thank you for your referral.    I have reviewed this plan of care and certify a need for medically necessary rehabilitation services.     Physician Signature:__________________________________________________________  Date:  Please sign and return

## 2023-01-04 NOTE — PROGRESS NOTES
[address] [place] [address]  PHYSICAL THERAPY EVALUATION      Physical Therapy: Initial Evaluation    Patient: Ruddy Cheng (89 y.o.     female)   Examination Date: 2023   :  1965 ;    Confirmed: Yes MRN: [id number]  CSN: [id number]   Insurance: Payor: Ronnie Peterson 150 / Plan: BCBS - [place] PPO / Product Type: *No Product type* /   Insurance ID: [id number] - ([address] [place] [address]) Secondary Insurance (if applicable):    Referring Physician: Kimberly Ramos MD       Visits to Date/Visits Approved:     No Show/Cancelled Appts:      Medical Diagnosis: Primary osteoarthritis of left knee [M17.12]        Treatment Diagnosis: L knee pain, L knee edema, decreased L knee ROM, decreased LLE strength, impaired gait and functional mobility s/p TKA     PERTINENT MEDICAL HISTORY   Patient Assessed for Rehabilitation Services: Yes       Medical History: Chart Reviewed: Yes   Past Medical History:   Diagnosis Date    Insomnia      Surgical History:   Past Surgical History:   Procedure Laterality Date    KNEE SURGERY      TOTAL KNEE ARTHROPLASTY Left 2022    LEFT TOTAL KNEE  ARTHROPLASTY REPLACEMENT, AfterYes TRIATHLON TOTAL KNEE SYSTEM, CHOICE WITH ADDUCTOR CANAL BLOCK , PAT WITH LOPEZ performed by Aylin Cuellar MD at [facility] OR       Medications:   Current Outpatient Medications:     acetaminophen (TYLENOL) 500 MG tablet, Take 2 tablets by mouth 3 times daily, Disp: 42 tablet, Rfl: 1    meloxicam (MOBIC) 15 MG tablet, Take 1 tablet by mouth daily, Disp: 30 tablet, Rfl: 1    docusate sodium (COLACE) 100 MG capsule, Take 1 capsule by mouth 2 times daily as needed for Constipation, Disp: 14 capsule, Rfl: 1    aspirin 81 MG EC tablet, Take 1 tablet by mouth 2 times daily, Disp: 56 tablet, Rfl: 0    Misc. Devices (BATH/SHOWER SEAT) MISC, Dispense 1 Shower/Bath seat, Disp: 1 each, Rfl: 0    Misc. Devices (CLASSICS ROLLING WALKER) MISC, Dispense 1 rolling walker, Disp: 1 each, Rfl: 0    Misc.  Devices (RAISED TOILET SEAT) MISC, Dispense 1 raised toilet seat, Disp: 1 each, Rfl: 0    gabapentin (NEURONTIN) 100 MG capsule, take 1 capsule by mouth at bedtime, Disp: , Rfl:     zolpidem (AMBIEN CR) 12.5 MG CR tablet, Take 12.5 mg by mouth, Disp: , Rfl:     escitalopram (LEXAPRO) 10 MG tablet, Take 20 mg by mouth, Disp: , Rfl:   Allergies: Patient has no known allergies. Imaging (if applicable): XR KNEE LEFT (3 VIEWS)    Result Date: 1/4/2023  Radiographs left knee, 3 views, AP bilateral weightbearing, lateral, merchant view. No acute fracture or dislocation. Left total knee arthroplasty with patellar resurfacing in good alignment. Right knee medial unicompartmental knee replacement. SUBJECTIVE EXAMINATION     History obtained from[de-identified] Patient, Chart Review,      Family/Caregiver Present: No    Subjective History: Onset Date: 12/12/22  Subjective: Pt s/p L TKA 12/12/22 by Dr. Koki Lancaster. Pt completed [facility]. Reports L knee is still sore. Using walker and cane PRN, stating she has started to ambulate w/o. For pain pt is using medication and ice. 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   Additional Pertinent Hx (if applicable): R partial knee replacement   Previous treatments prior to current episode?: Surgery, Home PT      Learning/Language: Learning  Does the patient/guardian have any barriers to learning?: No barriers  Will there be a co-learner?: No  What is the preferred language of the patient/guardian?: English  Is an  required?: No  How does the patient/guardian prefer to learn new concepts?: Listening, Reading, Demonstration, Pictures/Videos     Pain Screening    Pain Screening  Patient Currently in Pain: Yes  Pain Assessment: 0-10  Pain Level: 5  Pain Location: Knee  Pain Orientation: Left  Pain Descriptors: Sore, Aching  Pain Management/Relieving Factors: Rest, Ice, Medications    Functional Status    Social History:    Social History  Lives With: Alone  Type of Home: House  Home Layout: Two level, Laundry in basement, Bed/Bath upstairs  Home Access: Stairs to enter with rails  Entrance Stairs - Number of Steps: 4  Bathroom Shower/Tub: Tub/Shower unit  Home Equipment: elver Sanders    Occupation/Interests:   Occupation: Full time employment  Type of Occupation: , Construction    Prior Level of Function:     Independent        Current Level of Function:   Independent      ADL Assistance: Independent  Homemaking Assistance: Independent  Ambulation Assistance: Independent  Transfer Assistance: Independent  Active : Yes         OBJECTIVE EXAMINATION     Restrictions:    S/p L TKA    Review of Systems:  Vision: Within Functional Limits  Hearing: Within functional limits  Follows Commands: Within Functional Limits    Observations:   General Observations  Description: L knee edema; incision healing with scabbing present; small stitch present at bottom of incision    Mobility:     Ambulation  Surface: Carpet  Device: Rolling Walker  Assistance: Modified Independent  Gait Deviations: Slow Maye  Distance: 50ft  Comments: L knee flexed during stance, decreased heel strike  More Ambulation?: Yes  Ambulation 2  Device 2: No device  Assistance 2: Supervision  Gait Deviations: Slow Maye, Decreased step length  Distance: 25ft  Comments: L knee flexed during stance, decreased heel strike  Stairs/Curb  Stairs?: Yes  Stairs  # Steps : 8  Stairs Height: 8\"  Rails: Bilateral  Assistance: Modified independent   Comment: reciprocal pattern; pain with eccentric loading of L knee; weakness using left leg to climb up step       Left AROM  Right AROM         AROM LLE (degrees)  L Knee Flexion (0-145): 107  L Knee Extension (0): -5    AROM RLE (degrees)  R Knee Flexion (0-145): 130  R Knee Extension (0): 0        Left Strength  Right Strength         Strength LLE  L Hip Flexion: 4/5  L Hip Extension: 4/5  L Hip ABduction: 4/5  L Hip ADduction: 4/5  L Knee Flexion: 4-/5  L Knee Extension: 4-/5  L Ankle Dorsiflexion: 4+/5         Strength RLE  R Hip Flexion: 5/5  R Hip Extension: 5/5  R Hip ABduction: 5/5  R Hip ADduction: 5/5  R Knee Flexion: 5/5  R Knee Extension: 5/5  R Ankle Dorsiflexion: 5/5                 Joint Mobility:    L knee, mild hypomobility of patella    Outcomes Score:  Exam: LEFS: 33/80    Treatment:    Exercises:   Exercises  Exercise 1: HEP: heel slide w/ strap, long-stting HS/calf stretch w/ strap  Exercise 2: *bike  Exercise 3: *quad sets/SAQ  Exercise 4: *Step-ups  Exercise 5: *step-downs (start w/ low box and progress up to tolerance)  Exercise 6: *apollo leg press  Exercise 7: *balance training  Exercise 8: **manual interventions to assist in edema and pain free knee ROM     Modalities:  Modalities  Modalities:  (*CP, *GameReady Pneumatic Compression)        Manual:  Manual Therapy  Joint Mobilization: *L patellar mobs  Soft Tissue Mobilizaton: *L knee edema massage; scar tissue massage once healed     *Indicates exercise,modality, or manual techniques to be initiated when appropriate       ASSESSMENT     Impression: Assessment: Pt is a 60 yo female s/p L TKA 12/12/22. Pt presents with impaired gait and mobility secondary to L knee pain, decreased L knee ROM, L knee edema, and decreased LLE strength. 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 Body Structures, Functions, Activity Limitations Requiring Skilled Therapeutic Intervention: Increased pain, Decreased ROM, Decreased strength, Decreased functional mobility , Decreased balance, Decreased high-level IADLs    Statement of Medical Necessity: Physical Therapy is both indicated and medically necessary as outlined in the POC to increase the likelihood of meeting the functionally related goals stated below.      Patient's Activity Tolerance: Patient tolerated evaluation without incident      Patient's rehabilitation potential/prognosis is considered to be: Good    Factors which may impact rehabilitation potential include: None     Patient Education: PT Education: PT Role, Plan of Care, Goals, Evaluative findings, Home Exercise Program     GOALS   Patient Goal(s): Patient Goals : walk normal, return to work    Long Term Goals Completed by 4-6 weeks Goal Status   LTG 1 L knee ROM 0-120 w/o pain to improve ability to squat for work duties New   LTG 2 Toi LE strength 5/5 to allow reciprocal stair climbing and squatting/kneeling for work duties New   LTG 3 Decreased L knee pain to < 2/10 with ADLs and ambulation New   LTG 4 90% PLOF or better via subjective report or functional survey New   LTG 5 [place] with HEP New        TREATMENT PLAN       Requires PT Follow-Up: Yes    Treatment may include any combination of the following: ROM, Strengthening, Balance training, Functional mobility training, Gait training, Stair training, Neuromuscular re-education, Manual, Pain management, Home exercise program, Safety education & training, Patient/Caregiver education & training, Equipment evaluation, education, & procurement, Modalities  Modalities: Heat/Cold, Vasopneumatic Device     Frequency / Duration:  Patient to be seen 1-2 times per week for 4-6 weeks  Plan Comment:            Eval Complexity:   Decision Making: Low Complexity  History: Personal Factors and/or Comorbidities Impacting POC: Low  History: R partial knee replacement, L total knee replacement  Examination of body system(s) including body structures and functions, activity limitations, and/or participation restrictions: Low  Exam: LEFS: 33/80  Clinical Presentation: Low  Clinical Presentation: stable    POST-PAIN     Pain Rating (0-10 pain scale):  no change reported /10  Location and pain description same as pre-treatment unless indicated. Action: [] NA  [] Call Physician  [] Perform HEP  [x] Meds as prescribed    Evaluation and patient rights have been reviewed and patient agrees with plan of care.   Yes [x]  No  []   Explain:     Olga Fall Risk Assessment  Risk Factor Scale  Score   History of Falls [] Yes  [x] No 25  0    Secondary Diagnosis [x] Yes  (s/p TKA)  [] No 15  0 15   Ambulatory Aid [] Furniture  [x] Crutches/cane/walker  [] None/bedrest/wheelchair/nurse 30  15  0 15   IV/Heparin Lock [] Yes  [x] No 20  0    Gait/Transferring [] Impaired  [] Weak  [x] Normal/bedrest/immobile 20  10  0    Mental Status [] Forgets limitations  [x] Oriented to own ability 15  0       Total: 30     Based on the Assessment score: check the appropriate box.   []  No intervention needed   Low =   Score of 0-24  [x]  Use standard prevention interventions Moderate =  Score of 24-44   [] Discuss fall prevention strategies   [] Indicate moderate falls risk on eval  []  Use high risk prevention interventions High = Score of 45 and higher   [] Discuss fall prevention strategies   [] Provide supervision during treatment time      Minutes:  PT Individual Minutes  Time In: 1020  Time Out: 1100  Minutes: 40  Timed Code Treatment Minutes: 10 Minutes  Procedure Minutes: 30 min eval     Timed Activity Minutes Units   Ther Ex 10 1     Electronically signed by Joy Raymond PT on 1/4/23 at 11:36 AM EST

## 2023-01-06 ENCOUNTER — HOSPITAL ENCOUNTER (OUTPATIENT)
Dept: PHYSICAL THERAPY | Age: 58
Setting detail: THERAPIES SERIES
Discharge: HOME | End: 2023-01-06
Payer: COMMERCIAL

## 2023-01-06 PROCEDURE — 97110 THERAPEUTIC EXERCISES: CPT

## 2023-01-06 PROCEDURE — 97140 MANUAL THERAPY 1/> REGIONS: CPT

## 2023-01-06 ASSESSMENT — PAIN SCALES - GENERAL: PAINLEVEL_OUTOF10: 0

## 2023-01-06 ASSESSMENT — PAIN DESCRIPTION - DESCRIPTORS: DESCRIPTORS: TIGHTNESS

## 2023-01-06 ASSESSMENT — PAIN DESCRIPTION - ORIENTATION: ORIENTATION: LEFT

## 2023-01-06 NOTE — PROGRESS NOTES
[address] [facility]  Outpatient Physical Therapy    Treatment Note        Date: 2023  Patient: Eleln Conteh  : 1965   Confirmed: Yes    MRN: [id number]  Referring Provider: Shoshana Ballesteros MD    Medical Diagnosis: Primary osteoarthritis of left knee [M17.12]       Treatment Diagnosis: L knee pain, L knee edema, decreased L knee ROM, decreased LLE strength, impaired gait and functional mobility s/p TKA    Visit Information:  Insurance: Payor: Jonathan Suárez / Plan: [facility] - [place] PPO / Product Type: *No Product type* /   PT Visit Information  Onset Date: 22  PT Insurance Information: BCBC  Total # of Visits Approved: 50  Total # of Visits to Date: 2  No Show: 0  Canceled Appointment: 0  Progress Note Counter:  (RTD 23)    Subjective Information:  Subjective: patient reports stiffness in L knee at beginning of session. HEP Compliance:  [x] Good [] Fair [] Poor [] Reports not doing due to:    Pain Screening  Patient Currently in Pain: No  Pain Assessment: 0-10  Pain Level: 0  Pain Orientation: Left  Pain Descriptors: Tightness    Treatment:  Exercises:  Exercises  Exercise 1: Heel slides in supine with strap: x 10 -5s  Exercise 2: bike x 3 minutes (seat 3-4) - 5 full revolutions at end  Exercise 3: quad sets/SAQ x 10 ea -5s hold  Exercise 4: Step-ups x 10 F/L (6\")  Exercise 5: step-downs (start w/ low box and progress up to tolerance): x 10 2\" box  Exercise 6: *apollo leg press  Exercise 7: *balance training  Exercise 8: PROM to L knee in supine (flexion/ ext) x 5 minutes  Exercise 20: HEP: heel slide w/ strap, long-stting HS/calf stretch w/ strap       Manual:   Manual Therapy  Joint Mobilization: L patellar mobs x 3 minutes  Soft Tissue Mobilizaton: L knee edema massage; scar tissue massage once healed x 5 minutes       Objective Measures:             LTG 1 Current Status[de-identified] 23: -3 to 110          Assessment:    Body Structures, Functions, Activity Limitations Requiring Skilled Therapeutic Intervention: Increased pain, Decreased ROM, Decreased strength, Decreased functional mobility , Decreased balance, Decreased high-level IADLs  Assessment: Introduced additional AROM and functional strengthening exercise to improve ROM and strength in LLE. Patient tolerated all exercises and manual techniques with occ cues needed to complete with proper technique. Demostrated slight improvements in L knee ROM in flexion and extension post PROM and manual techniques. Treatment Diagnosis: L knee pain, L knee edema, decreased L knee ROM, decreased LLE strength, impaired gait and functional mobility s/p TKA  Therapy Prognosis: Good       Post-Pain Assessment:       Pain Rating (0-10 pain scale): 0  /10   Location and pain description same as pre-treatment unless indicated. Action: [] NA   [] Perform HEP  [] Meds as prescribed  [] Modalities as prescribed   [] Call Physician     GOALS   Patient Goal(s): Patient Goals : walk normal, return to work    Long Term Goals Completed by 4-6 weeks Goal Status   LTG 1 L knee ROM 0-120 w/o pain to improve ability to squat for work duties In progress   LTG 2 Toi LE strength 5/5 to allow reciprocal stair climbing and squatting/kneeling for work duties In progress   LTG 3 Decreased L knee pain to < 2/10 with ADLs and ambulation In progress   LTG 4 90% PLOF or better via subjective report or functional survey In progress   LTG 5 [place] with HEP In progress   Plan:  Frequency/Duration:  Plan  Plan Frequency: 1-2  Plan weeks: 4-6  Current Treatment Recommendations: ROM, Strengthening, Balance training, Functional mobility training, Gait training, Stair training, Neuromuscular re-education, Manual, Pain management, Home exercise program, Safety education & training, Patient/Caregiver education & training, Equipment evaluation, education, & procurement, Modalities  Modalities: Heat/Cold, Vasopneumatic Device  Pt to continue current HEP.   See objective section for any therapeutic exercise changes, additions or modifications this date.     Therapy Time:      PT Individual Minutes  Time In: 1021  Time Out: 1106  Minutes: 45  Timed Code Treatment Minutes: 45 Minutes  Procedure Minutes: 0  Timed Activity Minutes Units   Ther Ex 37 2   Manual  8 1     Electronically signed by Miguel Terrell PTA on 1/6/23 at 12:04 PM EST

## 2023-01-09 ENCOUNTER — HOSPITAL ENCOUNTER (OUTPATIENT)
Dept: PHYSICAL THERAPY | Age: 58
Setting detail: THERAPIES SERIES
Discharge: HOME | End: 2023-01-09
Payer: COMMERCIAL

## 2023-01-09 PROCEDURE — 97110 THERAPEUTIC EXERCISES: CPT

## 2023-01-09 ASSESSMENT — PAIN DESCRIPTION - ORIENTATION: ORIENTATION: LEFT

## 2023-01-09 ASSESSMENT — PAIN DESCRIPTION - FREQUENCY: FREQUENCY: CONTINUOUS

## 2023-01-09 ASSESSMENT — PAIN DESCRIPTION - DESCRIPTORS: DESCRIPTORS: TIGHTNESS

## 2023-01-09 ASSESSMENT — PAIN DESCRIPTION - LOCATION: LOCATION: KNEE

## 2023-01-09 ASSESSMENT — PAIN SCALES - GENERAL: PAINLEVEL_OUTOF10: 4

## 2023-01-09 NOTE — PROGRESS NOTES
[address] [facility]  Outpatient Physical Therapy    Treatment Note        Date: 2023  Patient: Brendon Mendieta  : 1965   Confirmed: Yes    MRN: [id number]  Referring Provider: Kiarra Magdaleno MD    Medical Diagnosis: Primary osteoarthritis of left knee [M17.12]       Treatment Diagnosis: L knee pain, L knee edema, decreased L knee ROM, decreased LLE strength, impaired gait and functional mobility s/p TKA    Visit Information:  Insurance: Payor: Deyvi Richard / Plan: BCBS - [place] PPO / Product Type: *No Product type* /   PT Visit Information  Onset Date: 22  PT Insurance Information: BCBC  Total # of Visits Approved: 50  Total # of Visits to Date: 3  No Show: 0  Canceled Appointment: 0  Progress Note Counter: 3/12 (RTD 23)    Subjective Information:  Subjective: Patient reports stiffness in superior L knee. \"It feels like my sking is going to rip. HEP Compliance:  [x] Good [] Fair [] Poor [] Reports not doing due to:    Pain Screening  Patient Currently in Pain: Yes  Pain Assessment: 0-10  Pain Level: 4  Pain Location: Knee  Pain Orientation: Left  Pain Descriptors: Tightness  Pain Frequency: Continuous    Treatment:  Exercises:  Exercises  Exercise 1: Heel slides in seated/supine: x 12 -5s  Exercise 2: bike x 4 minutes (seat 3-4) - occ full revolutions  Exercise 3: quad sets(heel elevated)/SAQ x 15 ea -5s hold  Exercise 4: Step-ups x 10 F/L (8\")  Exercise 5: step-downs (start w/ low box and progress up to tolerance): x 10 2\" box  Exercise 6: apollo leg press  x10 (10#)  Exercise 8: PROM to L knee in supine (flexion/ ext) x 5 minutes  Exercise 20: HEP: heel slide w/ strap, long-stting HS/calf stretch w/ strap, Heel slide seated (verbal)       Manual:   Manual Therapy  Soft Tissue Mobilizaton: L knee edema massage; scar tissue massage once healed x 8 minutes       Objective Measures:      LTG 1 Current Status[de-identified] 23: -2 to 112          Assessment:    Body Structures, Functions, Activity Limitations Requiring Skilled Therapeutic Intervention: Increased pain, Decreased ROM, Decreased strength, Decreased functional mobility , Decreased balance, Decreased high-level IADLs  Assessment: Continued with ROM exercises and manual techniques to improve ROM in L knee. Patient demonstrated good tolerance with manual soft tissue to reduce tightness in quad and hamstring musculature with good relief noted. Demonstrated improved tolerance when performing heel slides in seated relative to supine. Able to display a slight increase in L knee flexion and extension at the end of therapy. Treatment Diagnosis: L knee pain, L knee edema, decreased L knee ROM, decreased LLE strength, impaired gait and functional mobility s/p TKA  Therapy Prognosis: Good   Post-Pain Assessment:       Pain Rating (0-10 pain scale):   2 /10   Location and pain description same as pre-treatment unless indicated.    Action: [] NA   [x] Perform HEP  [] Meds as prescribed  [] Modalities as prescribed   [] Call Physician     GOALS   Patient Goal(s): Patient Goals : walk normal, return to work    Long Term Goals Completed by 4-6 weeks Goal Status   LTG 1 L knee ROM 0-120 w/o pain to improve ability to squat for work duties In progress   LTG 2 Toi LE strength 5/5 to allow reciprocal stair climbing and squatting/kneeling for work duties In progress   LTG 3 Decreased L knee pain to < 2/10 with ADLs and ambulation In progress   LTG 4 90% PLOF or better via subjective report or functional survey In progress   LTG 5 [place] with HEP In progress     Plan:  Frequency/Duration:  Plan  Plan Frequency: 1-2  Plan weeks: 4-6  Current Treatment Recommendations: ROM, Strengthening, Balance training, Functional mobility training, Gait training, Stair training, Neuromuscular re-education, Manual, Pain management, Home exercise program, Safety education & training, Patient/Caregiver education & training, Equipment evaluation, education, & procurement, Modalities  Modalities: Heat/Cold, Vasopneumatic Device  Pt to continue current HEP. See objective section for any therapeutic exercise changes, additions or modifications this date.     Therapy Time:      PT Individual Minutes  Time In: 1100  Time Out: 6386  Minutes: 47  Timed Code Treatment Minutes: 47 Minutes  Procedure Minutes: 0  Timed Activity Minutes Units   Ther Ex 39 3   Manual  8 0     Electronically signed by Josefina Hale PTA on 1/9/23 at 12:05 PM EST

## 2023-01-12 ENCOUNTER — HOSPITAL ENCOUNTER (OUTPATIENT)
Dept: PHYSICAL THERAPY | Age: 58
Setting detail: THERAPIES SERIES
Discharge: HOME | End: 2023-01-12
Payer: COMMERCIAL

## 2023-01-12 PROCEDURE — 97110 THERAPEUTIC EXERCISES: CPT

## 2023-01-12 PROCEDURE — 97140 MANUAL THERAPY 1/> REGIONS: CPT

## 2023-01-12 ASSESSMENT — PAIN DESCRIPTION - FREQUENCY: FREQUENCY: CONTINUOUS

## 2023-01-12 ASSESSMENT — PAIN DESCRIPTION - LOCATION: LOCATION: KNEE

## 2023-01-12 ASSESSMENT — PAIN DESCRIPTION - DESCRIPTORS: DESCRIPTORS: TIGHTNESS

## 2023-01-12 ASSESSMENT — PAIN DESCRIPTION - ORIENTATION: ORIENTATION: LEFT

## 2023-01-12 ASSESSMENT — PAIN SCALES - GENERAL: PAINLEVEL_OUTOF10: 2

## 2023-01-12 NOTE — PROGRESS NOTES
[address] [facility]  Outpatient Physical Therapy    Treatment Note        Date: 2023  Patient: Emma Mcconnell  : 1965   Confirmed: Yes  MRN: [id number]  Referring Provider: Corrina Millan MD    Medical Diagnosis: Primary osteoarthritis of left knee [M17.12]       Treatment Diagnosis: L knee pain, L knee edema, decreased L knee ROM, decreased LLE strength, impaired gait and functional mobility s/p TKA    Visit Information:  Insurance: Payor: Ronnie Peterson 150 / Plan: BCBS - [place] PPO / Product Type: *No Product type* /   PT Visit Information  Onset Date: 22  PT Insurance Information: BCBC  Total # of Visits Approved: 50  Total # of Visits to Date: 4  No Show: 0  Canceled Appointment: 0  Progress Note Counter:  (RTD 23)    Subjective Information:  Subjective: \"Still a little tight in the back of my knee. \"  HEP Compliance:  [x] Good [] Fair [] Poor [] Reports not doing due to:    Pain Screening  Patient Currently in Pain: Yes  Pain Assessment: 0-10  Pain Level: 2  Pain Location: Knee  Pain Orientation: Left  Pain Descriptors: Tightness  Pain Frequency: Continuous    Treatment:  Exercises:  Exercises  Exercise 1: Heel slides in supine: x 10 -5s  Exercise 2: bike x 4 minutes (seat 3-4) - partial to full revolutions  Exercise 3: quad sets(heel elevated)/SAQ x 15 ea -5s hold  Exercise 4: Step-ups x 10 F/L (8\")  Exercise 6: apollo leg press  x10 (10#)- Heel Raise x 10 (10#)  Exercise 9: TKE x 10 -5s RTB  Exercise 20: HEP: Cont Current       Manual:   Manual Therapy  Soft Tissue Mobilizaton: L knee edema massage; scar tissue. Quad and hamstring insertions x 8 minutes       Objective Measures:         LTG 1 Current Status[de-identified] 23: -1 to 114                      Assessment:    Body Structures, Functions, Activity Limitations Requiring Skilled Therapeutic Intervention: Increased pain, Decreased ROM, Decreased strength, Decreased functional mobility , Decreased balance, Decreased high-level IADLs  Assessment: Patient reports good relief in L knee tightness post STM to scar tissue and quad/hamstring musculature. Good tolerance to prone knee extension hang with improvement shown when measured. Patient continued to also display increased L knee flexion, demoing improvement from 112 to 114 degrees this date. Treatment Diagnosis: L knee pain, L knee edema, decreased L knee ROM, decreased LLE strength, impaired gait and functional mobility s/p TKA  Therapy Prognosis: Good       Post-Pain Assessment:       Pain Rating (0-10 pain scale):  0 /10   Location and pain description same as pre-treatment unless indicated. Action: [] NA   [x] Perform HEP  [] Meds as prescribed  [] Modalities as prescribed   [] Call Physician     GOALS   Patient Goal(s): Patient Goals : walk normal, return to work    Long Term Goals Completed by 4-6 weeks Goal Status   LTG 1 L knee ROM 0-120 w/o pain to improve ability to squat for work duties In progress   LTG 2 Toi LE strength 5/5 to allow reciprocal stair climbing and squatting/kneeling for work duties In progress   LTG 3 Decreased L knee pain to < 2/10 with ADLs and ambulation In progress   LTG 4 90% PLOF or better via subjective report or functional survey In progress   LTG 5 [place] with HEP In progress   Plan:  Frequency/Duration:  Plan  Plan Frequency: 1-2  Plan weeks: 4-6  Current Treatment Recommendations: ROM, Strengthening, Balance training, Functional mobility training, Gait training, Stair training, Neuromuscular re-education, Manual, Pain management, Home exercise program, Safety education & training, Patient/Caregiver education & training, Equipment evaluation, education, & procurement, Modalities  Modalities: Heat/Cold, Vasopneumatic Device  Pt to continue current HEP. See objective section for any therapeutic exercise changes, additions or modifications this date.     Therapy Time:      PT Individual Minutes  Time In: 0976  Time Out: 8376  Minutes: 43  Timed Code Treatment Minutes: 43 Minutes  Procedure Minutes: 0  Timed Activity Minutes Units   Ther Ex 35 2   Manual  8 1     Electronically signed by Deyvi Small PTA on 1/12/23 at 12:11 PM EST

## 2023-01-16 ENCOUNTER — HOSPITAL ENCOUNTER (OUTPATIENT)
Dept: PHYSICAL THERAPY | Age: 58
Setting detail: THERAPIES SERIES
Discharge: HOME | End: 2023-01-16
Payer: COMMERCIAL

## 2023-01-16 PROCEDURE — 97110 THERAPEUTIC EXERCISES: CPT

## 2023-01-16 PROCEDURE — 97116 GAIT TRAINING THERAPY: CPT

## 2023-01-16 ASSESSMENT — PAIN DESCRIPTION - LOCATION: LOCATION: KNEE

## 2023-01-16 ASSESSMENT — PAIN SCALES - GENERAL: PAINLEVEL_OUTOF10: 4

## 2023-01-16 ASSESSMENT — PAIN DESCRIPTION - DESCRIPTORS: DESCRIPTORS: TIGHTNESS;SORE

## 2023-01-16 ASSESSMENT — PAIN DESCRIPTION - ORIENTATION: ORIENTATION: LEFT

## 2023-01-16 NOTE — PROGRESS NOTES
[address] [facility]  Outpatient Physical Therapy    Treatment Note        Date: 2023  Patient: Sandeep Wilson  : 1965   Confirmed: Yes  MRN: [id number]  Referring Provider: Eb Smith MD    Medical Diagnosis: Primary osteoarthritis of left knee [M17.12]       Treatment Diagnosis: L knee pain, L knee edema, decreased L knee ROM, decreased LLE strength, impaired gait and functional mobility s/p TKA    Visit Information:  Insurance: Payor: Luciano Martino / Plan: BCBS - [place] PPO / Product Type: *No Product type* /   PT Visit Information  Onset Date: 22  PT Insurance Information: BCBC  Total # of Visits Approved: 50  Total # of Visits to Date: 5  No Show: 0  Canceled Appointment: 0  Progress Note Counter:  (RTD 23)    Subjective Information:  Subjective: Pt reports her quad feels tight and the inside of her L ankle also feel sore. Pt states she was active all weekend, trying to exercise a lot. Pt using Tylenol for discomfort. HEP Compliance:  [x] Good [] Fair [] Poor [] Reports not doing due to:    Pain Screening  Patient Currently in Pain: Yes  Pain Assessment: 0-10  Pain Level: 4  Pain Location: Knee (quad)  Pain Orientation: Left  Pain Descriptors: Tightness, Sore    Treatment:  Exercises:  Exercises  Exercise 1: Bike: seat 5 x 5 min for knee ROM, level 1 resistance  Exercise 2: supine quad set to SLR: x 20 LLE  Exercise 3: Mod Neil stretch for quad: x 3' ochoa  Exercise 4: Treadmill: 1.7-1.9 mph x 9 min (focus on symmetrical step length, increasing step length, & heel/toe pattern)  Exercise 5: 1/2 long sitting, gastroc/HS stretch w/ strap: 20\" holds x 5 LLE       Modalities:   Declined by patient       *Indicates exercise, modality, or manual techniques to be initiated when appropriate    Objective Measures:        PROM LLE (degrees)  LLE General PROM: posterior knee edema; scar well mobile  L Knee Flexion (0-145): 114  L Knee Extension (0): 0       Assessment:    Body Structures, Functions, Activity Limitations Requiring Skilled Therapeutic Intervention: Increased pain, Decreased ROM, Decreased strength, Decreased functional mobility , Decreased balance, Decreased high-level IADLs  Assessment: Pt verbalizes knee and quad soreness. Scar mobility improving with good soft tissue and patellar mobility also identified. Posterior knee tightness verbalized with end range extension. Focused on gait quality today to reduce deviations and compensations. Improved mechanics displayed pre to post session with decreased muscle soreness reported. 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 Treatment Diagnosis: L knee pain, L knee edema, decreased L knee ROM, decreased LLE strength, impaired gait and functional mobility s/p TKA  Therapy Prognosis: Good            Post-Pain Assessment:       Pain Rating (0-10 pain scale):  < 4 /10   Location and pain description same as pre-treatment unless indicated.    Action: [x] NA   [] Perform HEP  [] Meds as prescribed  [] Modalities as prescribed   [] Call Physician     GOALS   Patient Goal(s): Patient Goals : walk normal, return to work    Long Term Goals Completed by 4-6 weeks Goal Status   LTG 1 L knee ROM 0-120 w/o pain to improve ability to squat for work duties In progress   LTG 2 Toi LE strength 5/5 to allow reciprocal stair climbing and squatting/kneeling for work duties In progress   LTG 3 Decreased L knee pain to < 2/10 with ADLs and ambulation In progress   LTG 4 90% PLOF or better via subjective report or functional survey In progress   LTG 5 [place] with HEP In progress          Plan:  Frequency/Duration:  Plan  Plan Frequency: 1-2  Plan weeks: 4-6  Current Treatment Recommendations: ROM, Strengthening, Balance training, Functional mobility training, Gait training, Stair training, Neuromuscular re-education, Manual, Pain management, Home exercise program, Safety education & training, Patient/Caregiver education & training, Equipment evaluation, education, & procurement, Modalities  Modalities: Heat/Cold, Vasopneumatic Device  Pt to continue current HEP. See objective section for any therapeutic exercise changes, additions or modifications this date.     Therapy Time:      PT Individual Minutes  Time In: 1020  Time Out: 1100  Minutes: 40  Timed Code Treatment Minutes: 40 Minutes  Procedure Minutes: 0  Timed Activity Minutes Units   Ther Ex 30 2   Gait 10 1     Electronically signed by Warren Devi PT on 1/16/23 at 11:02 AM EST

## 2023-01-19 ENCOUNTER — HOSPITAL ENCOUNTER (OUTPATIENT)
Dept: PHYSICAL THERAPY | Age: 58
Setting detail: THERAPIES SERIES
Discharge: HOME | End: 2023-01-19
Payer: COMMERCIAL

## 2023-01-19 PROCEDURE — 97110 THERAPEUTIC EXERCISES: CPT

## 2023-01-19 ASSESSMENT — PAIN SCALES - GENERAL: PAINLEVEL_OUTOF10: 2

## 2023-01-19 ASSESSMENT — PAIN DESCRIPTION - LOCATION: LOCATION: KNEE

## 2023-01-19 ASSESSMENT — PAIN DESCRIPTION - ORIENTATION: ORIENTATION: LEFT;OUTER

## 2023-01-19 ASSESSMENT — PAIN DESCRIPTION - DESCRIPTORS: DESCRIPTORS: TIGHTNESS;SORE

## 2023-01-19 ASSESSMENT — PAIN DESCRIPTION - FREQUENCY: FREQUENCY: CONTINUOUS

## 2023-01-19 NOTE — PROGRESS NOTES
[address] [facility]  Outpatient Physical Therapy    Treatment Note        Date: 2023  Patient: Thanh Sommer  : 1965   Confirmed: Yes  MRN: [id number]  Referring Provider: Wyatt Law MD    Medical Diagnosis: Primary osteoarthritis of left knee [M17.12]       Treatment Diagnosis: L knee pain, L knee edema, decreased L knee ROM, decreased LLE strength, impaired gait and functional mobility s/p TKA    Visit Information:  Insurance: Payor: Tabby Phan / Plan: BC - [place] PPO / Product Type: *No Product type* /   PT Visit Information  Onset Date: 22  PT Insurance Information: BCBC  Total # of Visits Approved: 50  Total # of Visits to Date: 6  No Show: 0  Canceled Appointment: 0  Progress Note Counter:  (RTD 23)    Subjective Information:  Subjective: Patient reports using her bike at home and states \"its been fine this week, still tight. \"  HEP Compliance:  [x] Good [] Fair [] Poor [] Reports not doing due to:    Pain Screening  Patient Currently in Pain: Yes  Pain Assessment: 0-10  Pain Level: 2  Pain Location: Knee  Pain Orientation: Left, Outer  Pain Descriptors: Tightness, Sore  Pain Frequency: Continuous    Treatment:  Exercises:  Exercises  Exercise 1: Bike: seat 5 x 5 min for knee ROM, level 1 resistance  Exercise 2: supine quad set to SLR: x 20 LLE  Exercise 3: Mod Neil stretch+ strap: 5 x 20s on R  Exercise 4: Treadmill: 1.7-1.9 mph x 9 min (focus on symmetrical step length, increasing step length, & heel/toe pattern)  Exercise 5: 1/2 long sitting, gastroc/HS stretch w/ strap: 20\" holds x 5 LLE  Exercise 6: apollo leg press  2 x 10 (10#)- Heel Raise  x 10 (10#)  Exercise 20: HEP: Cont Current + Mod Neil with strap   Objective Measures:         LTG 1 Current Status[de-identified] 23: -1 to 115      Assessment:    Body Structures, Functions, Activity Limitations Requiring Skilled Therapeutic Intervention: Increased pain, Decreased ROM, Decreased strength, Decreased functional mobility , Decreased balance, Decreased high-level IADLs  Assessment: Demoing improvements with gait quality this session with decreased deviations and increased heel to toe pattern and LLE extension at midstance phase. Continued to focus on reaching full extension and improving flexion this date with mild improvements shown. 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 Treatment Diagnosis: L knee pain, L knee edema, decreased L knee ROM, decreased LLE strength, impaired gait and functional mobility s/p TKA  Therapy Prognosis: Good     Post-Pain Assessment:       Pain Rating (0-10 pain scale): 1-2  /10   Location and pain description same as pre-treatment unless indicated. Action: [] NA   [x] Perform HEP  [] Meds as prescribed  [] Modalities as prescribed   [] Call Physician     GOALS   Patient Goal(s): Patient Goals : walk normal, return to work    Long Term Goals Completed by 4-6 weeks Goal Status   LTG 1 L knee ROM 0-120 w/o pain to improve ability to squat for work duties In progress   LTG 2 Toi LE strength 5/5 to allow reciprocal stair climbing and squatting/kneeling for work duties In progress   LTG 3 Decreased L knee pain to < 2/10 with ADLs and ambulation In progress   LTG 4 90% PLOF or better via subjective report or functional survey In progress   LTG 5 [place] with HEP In progress          Plan:  Frequency/Duration:  Plan  Plan Frequency: 1-2  Plan weeks: 4-6  Current Treatment Recommendations: ROM, Strengthening, Balance training, Functional mobility training, Gait training, Stair training, Neuromuscular re-education, Manual, Pain management, Home exercise program, Safety education & training, Patient/Caregiver education & training, Equipment evaluation, education, & procurement, Modalities  Modalities: Heat/Cold, Vasopneumatic Device  Pt to continue current HEP.   See objective section for any therapeutic exercise changes, additions or modifications this date.     Therapy Time:      PT Individual Minutes  Time In: 5600  Time Out: 3232  Minutes: 41  Timed Code Treatment Minutes: 41 Minutes  Procedure Minutes: 0  Timed Activity Minutes Units   Ther Ex 41 3   Electronically signed by Franck Jorge PTA on 1/19/23 at 11:02 AM EST

## 2023-01-23 ENCOUNTER — HOSPITAL ENCOUNTER (OUTPATIENT)
Dept: PHYSICAL THERAPY | Age: 58
Setting detail: THERAPIES SERIES
Discharge: HOME | End: 2023-01-23
Payer: COMMERCIAL

## 2023-01-23 PROCEDURE — 97110 THERAPEUTIC EXERCISES: CPT

## 2023-01-23 ASSESSMENT — PAIN SCALES - GENERAL: PAINLEVEL_OUTOF10: 3

## 2023-01-23 ASSESSMENT — PAIN DESCRIPTION - LOCATION: LOCATION: KNEE

## 2023-01-23 ASSESSMENT — PAIN DESCRIPTION - ORIENTATION: ORIENTATION: LEFT

## 2023-01-23 ASSESSMENT — PAIN DESCRIPTION - DESCRIPTORS: DESCRIPTORS: SORE

## 2023-01-23 NOTE — PROGRESS NOTES
[address] [facility]  Outpatient Physical Therapy    Treatment Note        Date: 2023  Patient: Carito Mckeon  : 1965   Confirmed: Yes  MRN: [id number]  Referring Provider: Es Batista MD    Medical Diagnosis: Primary osteoarthritis of left knee [M17.12]       Treatment Diagnosis: L knee pain, L knee edema, decreased L knee ROM, decreased LLE strength, impaired gait and functional mobility s/p TKA    Visit Information:  Insurance: Payor: Carlota Clause / Plan: BCBS - [place] PPO / Product Type: *No Product type* /   PT Visit Information  Onset Date: 22  PT Insurance Information: BCBC  Total # of Visits Approved: 50  Total # of Visits to Date: 7  No Show: 0  Canceled Appointment: 0  Progress Note Counter:  (RTD 23)    Subjective Information:  Subjective: Pt reports she has been riding her stationary bike at home. Knee still feels tight and sore along the outside.   HEP Compliance:  [x] Good [] Fair [] Poor [] Reports not doing due to:    Pain Screening  Patient Currently in Pain: Yes  Pain Assessment: 0-10  Pain Level: 3  Pain Location: Knee  Pain Orientation: Left  Pain Descriptors: Sore    Treatment:  Exercises:  Exercises  Exercise 1: supine HS stretch w/ strap: 30\" x 5, LLE  Exercise 2: S/L hip ABD circles: 3 x 10 ochoa  Exercise 3: Treadmill: 1.7- 2.0 mph x 5 min (focus on symmetrical step length, increasing stride, & heel/toe pattern)  Exercise 4: TKEs, standing: RTB x 20  Exercise 5: Apollo leg press: double leg, 20# x 15, 30# x 15  Exercise 6: walking march: 3\" holds, 40ft lap x 2  Exercise 7: step-up march: 3\" hold, x 10 ochoa (6\" step)  Treatment Reasoning  Limitations addressed: Strength, Coordination, Balance, Activity tolerance  Therapist provided: Verbal cuing (visual cuing)    Manual:   Manual Therapy  Other: assessed patellar mobs, scar tissue mobility, and passive extension to neutral; all WFL and improving       Modalities:   Declined by patient; prefers to ice at home       *Indicates exercise, modality, or manual techniques to be initiated when appropriate    Objective Measures:      AROM LLE (degrees)  L Knee Flexion (0-145): 115  L Knee Extension (0): -2 AROM; 0 PROM         Assessment: Body Structures, Functions, Activity Limitations Requiring Skilled Therapeutic Intervention: Increased pain, Decreased ROM, Decreased strength, Decreased functional mobility , Decreased balance, Decreased high-level IADLs  Assessment: Pt demo's ongoing improvement in L knee ROM, strength, and dynamic stability. Mild soreness still reported at this time. Knee musculature and scar tissue soft. Pt progressing toward all therapy goals. Treatment Diagnosis: L knee pain, L knee edema, decreased L knee ROM, decreased LLE strength, impaired gait and functional mobility s/p TKA  Therapy Prognosis: Good          Post-Pain Assessment:       Pain Rating (0-10 pain scale):  no change /10   Location and pain description same as pre-treatment unless indicated.    Action: [] NA   [] Perform HEP  [] Meds as prescribed  [x] Modalities as prescribed   [] Call Physician     GOALS   Patient Goal(s): Patient Goals : walk normal, return to work    Long Term Goals Completed by 4-6 weeks Goal Status   LTG 1 L knee ROM 0-120 w/o pain to improve ability to squat for work duties In progress   LTG 2 Toi LE strength 5/5 to allow reciprocal stair climbing and squatting/kneeling for work duties In progress   LTG 3 Decreased L knee pain to < 2/10 with ADLs and ambulation In progress   LTG 4 90% PLOF or better via subjective report or functional survey In progress   LTG 5 [place] with HEP In progress          Plan:  Frequency/Duration:  Plan  Plan Frequency: 1-2  Plan weeks: 4-6  Current Treatment Recommendations: ROM, Strengthening, Balance training, Functional mobility training, Gait training, Stair training, Neuromuscular re-education, Manual, Pain management, Home exercise program, Safety education & training, Patient/Caregiver education & training, Equipment evaluation, education, & procurement, Modalities  Modalities: Heat/Cold, Vasopneumatic Device  Pt to continue current HEP. See objective section for any therapeutic exercise changes, additions or modifications this date.     Therapy Time:      PT Individual Minutes  Time In: 1110  Time Out: 6596  Minutes: 42  Timed Code Treatment Minutes: 40 Minutes  Procedure Minutes: 0  Timed Activity Minutes Units   Ther Ex 38 3   Manual  2 0     Electronically signed by Gonzalez Glass PT on 1/23/23 at 11:56 AM EST

## 2023-01-26 ENCOUNTER — HOSPITAL ENCOUNTER (OUTPATIENT)
Dept: PHYSICAL THERAPY | Age: 58
Setting detail: THERAPIES SERIES
Discharge: HOME | End: 2023-01-26
Payer: COMMERCIAL

## 2023-01-26 PROCEDURE — 97110 THERAPEUTIC EXERCISES: CPT

## 2023-01-26 ASSESSMENT — PAIN DESCRIPTION - ORIENTATION: ORIENTATION: LEFT

## 2023-01-26 ASSESSMENT — PAIN DESCRIPTION - LOCATION: LOCATION: KNEE

## 2023-01-26 ASSESSMENT — PAIN SCALES - GENERAL: PAINLEVEL_OUTOF10: 3

## 2023-01-26 ASSESSMENT — PAIN DESCRIPTION - FREQUENCY: FREQUENCY: CONTINUOUS

## 2023-01-26 ASSESSMENT — PAIN DESCRIPTION - DESCRIPTORS: DESCRIPTORS: TIGHTNESS

## 2023-01-26 NOTE — PROGRESS NOTES
[address] [facility]  Outpatient Physical Therapy    Treatment Note        Date: 2023  Patient: Rowdy Vazquez  : 1965   Confirmed: Yes  MRN: [id number]  Referring Provider: Barbara Feliciano MD    Medical Diagnosis: Primary osteoarthritis of left knee [M17.12]       Treatment Diagnosis: L knee pain, L knee edema, decreased L knee ROM, decreased LLE strength, impaired gait and functional mobility s/p TKA    Visit Information:  Insurance: Payor: Radha Singer / Plan: BCBS - [place] PPO / Product Type: *No Product type* /   PT Visit Information  Onset Date: 22  PT Insurance Information: BCBC  Total # of Visits Approved: 50  Total # of Visits to Date: 8  No Show: 0  Canceled Appointment: 0  Progress Note Counter:  (RTD 23)    Subjective Information:  Subjective: Pt states she has tightness in the front of L knee. HEP Compliance:  [x] Good [] Fair [] Poor [] Reports not doing due to:    Pain Screening  Patient Currently in Pain: Yes  Pain Assessment: 0-10  Pain Level: 3  Pain Location: Knee  Pain Orientation: Left  Pain Descriptors: Tightness  Pain Frequency: Continuous    Treatment:  Exercises:  Exercises  Exercise 1: supine HS stretch w/ strap: 30\" x 5, LLE  Exercise 2: S/L hip ABD circles: 3 x 10 ochoa  Exercise 3: Treadmill: 1.7- 2.0 mph x 5 min (focus on symmetrical step length, increasing stride, & heel/toe pattern)  Exercise 4: TKEs, standing: RTB x 20 -5s  Exercise 5: Apollo leg press and HR: double leg, 20# x 15 ea  Exercise 6: walking march: 3\" holds, 40ft lap x 2  Exercise 7: step-up march: 3\" hold, x 10 ochoa (6\" step)  Exercise 8: Quad sets with foot on yoga block x 10 -5 s  Exercise 9: Bike: seat 4 x 5 min for knee ROM, level 4 resistance  Exercise 20: HEP: Cont Current + Mod Neil with strap     Objective Measures:      LTG 1 Current Status[de-identified] 23: -1- -2 to 118          Assessment:    Body Structures, Functions, Activity Limitations Requiring Skilled Therapeutic Intervention: Increased pain, Decreased ROM, Decreased strength, Decreased functional mobility , Decreased balance, Decreased high-level IADLs  Assessment: Improvements made this date with L knee flexion, stability, and reduced tightness. Trialed quad sets with L foot on yoga block to assist in improving L knee extension with very slight improvements. Mild fatigue displayed at end of tx session. Treatment Diagnosis: L knee pain, L knee edema, decreased L knee ROM, decreased LLE strength, impaired gait and functional mobility s/p TKA  Therapy Prognosis: Good   Post-Pain Assessment:       Pain Rating (0-10 pain scale): 2  /10   Location and pain description same as pre-treatment unless indicated. Action: [] NA   [] Perform HEP  [] Meds as prescribed  [] Modalities as prescribed   [] Call Physician     GOALS   Patient Goal(s): Patient Goals : walk normal, return to work    Long Term Goals Completed by 4-6 weeks Goal Status   LTG 1 L knee ROM 0-120 w/o pain to improve ability to squat for work duties In progress   LTG 2 Toi LE strength 5/5 to allow reciprocal stair climbing and squatting/kneeling for work duties In progress   LTG 3 Decreased L knee pain to < 2/10 with ADLs and ambulation In progress   LTG 4 90% PLOF or better via subjective report or functional survey In progress   LTG 5 [place] with HEP In progress   Plan:  Frequency/Duration:  Plan  Plan Frequency: 1-2  Plan weeks: 4-6  Current Treatment Recommendations: ROM, Strengthening, Balance training, Functional mobility training, Gait training, Stair training, Neuromuscular re-education, Manual, Pain management, Home exercise program, Safety education & training, Patient/Caregiver education & training, Equipment evaluation, education, & procurement, Modalities  Modalities: Heat/Cold, Vasopneumatic Device  Pt to continue current HEP. See objective section for any therapeutic exercise changes, additions or modifications this date.     Therapy Time: PT Individual Minutes  Time In: 4822  Time Out: 4002  Minutes: 46  Timed Code Treatment Minutes: 46 Minutes  Procedure Minutes: 0  Timed Activity Minutes Units   Ther Ex 46 3   Electronically signed by Conrad Bailey PTA on 1/26/23 at 11:04 AM EST

## 2023-01-30 ENCOUNTER — HOSPITAL ENCOUNTER (OUTPATIENT)
Dept: PHYSICAL THERAPY | Age: 58
Setting detail: THERAPIES SERIES
Discharge: HOME | End: 2023-01-30
Payer: COMMERCIAL

## 2023-01-30 PROCEDURE — 97110 THERAPEUTIC EXERCISES: CPT

## 2023-01-30 ASSESSMENT — PAIN DESCRIPTION - ORIENTATION: ORIENTATION: LEFT

## 2023-01-30 ASSESSMENT — PAIN DESCRIPTION - LOCATION: LOCATION: KNEE

## 2023-01-30 NOTE — PROGRESS NOTES
[address] [facility]  Outpatient Physical Therapy    Treatment Note        Date: 2023  Patient: Violet Cameron  : 1965   Confirmed: Yes  MRN: [id number]  Referring Provider: Marina Bose MD    Medical Diagnosis: Primary osteoarthritis of left knee [M17.12]       Treatment Diagnosis: L knee pain, L knee edema, decreased L knee ROM, decreased LLE strength, impaired gait and functional mobility s/p TKA    Visit Information:  Insurance: Payor: Sarah Love / Plan: BC - [place] PPO / Product Type: *No Product type* /   PT Visit Information  Onset Date: 22  PT Insurance Information: BCBC  Total # of Visits Approved: 50  Total # of Visits to Date: 9  No Show: 0  Canceled Appointment: 0  Progress Note Counter:  (RTD 23)    Subjective Information:  Subjective: Pt reports she is doing HEP 3x/day.   HEP Compliance:  [x] Good [] Fair [] Poor [] Reports not doing due to:    Pain Screening  Patient Currently in Pain: No  Pain Location: Knee  Pain Orientation: Left    Treatment:  Exercises:  Exercises  Exercise 1: ROM/strength measures  Exercise 2: Bike: seat 3 x 5 min  Exercise 3: supine quad set with heel prop: 5\" x 10  Exercise 4: Apollo leg press: 10# plate, single leg, 4 x 6 ochoa  Exercise 5: Flight of 8\" steps, reciprocal w/ ochoa HR  Exercise 6: STS with unilateral focus (semi-tandem, with table raises hips higher than knees): 3 x 5 ochoa  Exercise 8: HEP: supine heel prop, SL STS from EOB       Modalities:   Declined by patient       *Indicates exercise, modality, or manual techniques to be initiated when appropriate    Objective Measures:     Strength LLE  L Hip Flexion: 4+/5  L Hip Extension: 4/5  L Hip ABduction: 4+/5  L Hip ADduction: 4+/5  L Knee Flexion: 4/5  L Knee Extension: 4/5  L Ankle Dorsiflexion: 4+/5       LTG 1 Current Status[de-identified] 23: L knee, 0-110 AROM  LTG 2 Current Status[de-identified] 23: stairs reciprocal; see strength measures  LTG 3 Current Status[de-identified] 23: 3/10 pain at most verbalized; primary complaint of stiffness  LTG 4 Current Status[de-identified] 60% PLOF verbalized; LEFS score: 47/80 (previously 33/80)  LTG 5 Current Status[de-identified] 1/30/23: completing HEP 3x/day          Assessment: Body Structures, Functions, Activity Limitations Requiring Skilled Therapeutic Intervention: Increased pain, Decreased ROM, Decreased strength, Decreased functional mobility , Decreased balance, Decreased high-level IADLs  Assessment: 9 PT sessions completed to date. Pt progressing toward all therapy goals with noted improvement in knee ROM and LE strength. Pt still verbalizes discomfort in knee secondary to stiffness. Unilateral weakness in LLE still displayed. Pt progressing into unilateral exercises to further strengthening LLE w/o compensation from RLE. Cont'd PT recommended to further improve functional strength as pt anticipates RTW with laborous demands. Treatment Diagnosis: L knee pain, L knee edema, decreased L knee ROM, decreased LLE strength, impaired gait and functional mobility s/p TKA  Therapy Prognosis: Good          Post-Pain Assessment:       Pain Rating (0-10 pain scale):   0/10   Location and pain description same as pre-treatment unless indicated.    Action: [] NA   [x] Perform HEP  [] Meds as prescribed  [x] Modalities as prescribed   [] Call Physician     GOALS   Patient Goal(s): Patient Goals : walk normal, return to work    Long Term Goals Completed by 4-6 weeks Goal Status   LTG 1 L knee ROM 0-120 w/o pain to improve ability to squat for work duties In progress   LTG 2 Toi LE strength 5/5 to allow reciprocal stair climbing and squatting/kneeling for work duties In progress   LTG 3 Decreased L knee pain to < 2/10 with ADLs and ambulation In progress   LTG 4 90% PLOF or better via subjective report or functional survey In progress   LTG 5 [place] with HEP In progress          Plan:  Frequency/Duration:  Plan  Plan Frequency: 1-2  Plan weeks: 4-6  Current Treatment Recommendations: ROM, Strengthening, Balance training, Functional mobility training, Gait training, Stair training, Neuromuscular re-education, Manual, Pain management, Home exercise program, Safety education & training, Patient/Caregiver education & training, Equipment evaluation, education, & procurement, Modalities  Modalities: Heat/Cold, Vasopneumatic Device  Pt to continue current HEP. See objective section for any therapeutic exercise changes, additions or modifications this date.     Therapy Time:      PT Individual Minutes  Time In: 1021  Time Out: 1101  Minutes: 40  Timed Code Treatment Minutes: 40 Minutes  Procedure Minutes: 0  Timed Activity Minutes Units   Ther Ex 40 3     Electronically signed by Yuridia Puente, PT on 1/30/23 at 11:46 AM EST

## 2023-01-30 NOTE — PROGRESS NOTES
[address]  PHYSICAL THERAPY PLAN OF CARE   [address], [address] [facility]         Ph: [phone]  Fax: [phone]    [] Certification  [] Recertification []  Plan of Care  [x] Progress Note [] Discharge      Referring Provider: Otis Sandifer, MD     From:  Charisse May, PT, DPT  Patient: Rigo Curry (59 y.o. female) : 1965 Date: 2023  Medical Diagnosis: Primary osteoarthritis of left knee [M17.12]       Treatment Diagnosis: L knee pain, L knee edema, decreased L knee ROM, decreased LLE strength, impaired gait and functional mobility s/p TKA    Progress Report Period from:  2023  to 2023    Visits to Date: 9 No Show: 0 Cancelled Appts: 0    OBJECTIVE:   Long Term Goals - Time Frame for Long Term Goals : 4-6 weeks  Goals Current/ Discharge status Status   Long Term Goal 1: L knee ROM 0-120 w/o pain to improve ability to squat for work duties LTG 1 Current Status[de-identified] 23: L knee, 0-110 AROM   In progress   Long Term Goal 2: Toi LE strength 5/5 to allow reciprocal stair climbing and squatting/kneeling for work duties LTG 2 Current Status[de-identified] 23: stairs reciprocal; see strength measures   In progress   Long Term Goal 3: Decreased L knee pain to < 2/10 with ADLs and ambulation LTG 3 Current Status[de-identified] 23: 3/10 pain at most verbalized; primary complaint of stiffness   In progress   Long Term Goal 4: 90% PLOF or better via subjective report or functional survey LTG 4 Current Status[de-identified] 60% PLOF verbalized; LEFS score: 47/80 (previously 33/80)   In progress   Long Term Goal 5:  [place] with HEP LTG 5 Current Status[de-identified] 23: completing HEP 3x/day   In progress     Body Structures, Functions, Activity Limitations Requiring Skilled Therapeutic Intervention: Increased pain, Decreased ROM, Decreased strength, Decreased functional mobility , Decreased balance, Decreased high-level IADLs  Assessment: 9 PT sessions completed to date; progress note completed for RTD visit 1/31/23. Pt progressing toward all therapy goals with noted improvement in knee ROM and LE strength. Pt still verbalizes discomfort in knee secondary to stiffness. Unilateral weakness in LLE still displayed. Pt progressing into unilateral exercises to further strengthening LLE w/o compensation from RLE. Cont'd PT recommended to further improve functional strength as pt anticipates RTW with laborous demands. Therapy Prognosis: Good      PLAN:   Frequency/Duration:  Plan Frequency: 1-2  Plan weeks: 4-6  Current Treatment Recommendations: ROM, Strengthening, Balance training, Functional mobility training, Gait training, Stair training, Neuromuscular re-education, Manual, Pain management, Home exercise program, Safety education & training, Patient/Caregiver education & training, Equipment evaluation, education, & procurement, Modalities  Modalities: Heat/Cold, Vasopneumatic Device     Precautions:     L TKA 12/12/22                       Patient Status:[x] Continue/ Initiate plan of Care     [] Discharge PT. Recommend pt continue with HEP. [] Additional visits requested, Please re-certify for additional visits:     [] Hold        Signature: Electronically signed by Jose Grider PT on 1/30/23 at 11:50 AM EST      If you have any questions or concerns, please don't hesitate to call. Thank you for your referral.    I have reviewed this plan of care and certify a need for medically necessary rehabilitation services.     Physician Signature:__________________________________________________________  Date:  Please sign and return

## 2023-01-31 ENCOUNTER — OFFICE VISIT (OUTPATIENT)
Dept: ORTHOPEDIC SURGERY | Age: 58
End: 2023-01-31

## 2023-01-31 DIAGNOSIS — Z96.652 STATUS POST LEFT KNEE REPLACEMENT: Primary | ICD-10-CM

## 2023-01-31 PROCEDURE — 99024 POSTOP FOLLOW-UP VISIT: CPT | Performed by: PHYSICIAN ASSISTANT

## 2023-01-31 RX ORDER — NAPROXEN 500 MG/1
500 TABLET ORAL 2 TIMES DAILY WITH MEALS
Qty: 60 TABLET | Refills: 3 | Status: SHIPPED | OUTPATIENT
Start: 2023-01-31

## 2023-01-31 NOTE — PROGRESS NOTES
Narrative Referring Provider:   Aida Mark      PCP:   [place] MD    ============================  IMPRESSION/PLAN:  ============================  Annalisa JIMMY Flores is s/p left Total knee replacement completed on 2022. IMPRESSION: Excellent early outcome and No complaints or limitations    PLAN:  No new treatment indicated. and Continue physical therapy. Routine follow-up. Ice compression and elevation  Patient Reassurance: Normal post-operative course discussed with patient. Patient reassured and supported. All questions answered. Follow up 3 months No X-Rays Needed    Patient presents today for a a routine 1st post-op visit    Status post op:  left Total knee replacement     BMI: There is no height or weight on file to calculate BMI. Post-operative recovery was complicated by uneventful/none. Patient rates their condition as improving. Does the patient still experience pain? 2/10 pain, aching    Post Op discharge patient location: in home. Functional Assessment is as follows: is ready to begin outpatient PT. Functional difficulties: None. Pain Medication: Tylenol, rare oxycodone  Currently Ambulating with: a cane    =================================  EXAM: POST OP KNEE  =================================  Right Post-Operative Knee    Ambulates with a limp favoring the right. SKIN: Appropriate postop appearance, No evidence of erythema, warmth, discharge or drainage and Incision clean/dry/intact. Range of motion is 0 degrees in extension and   90 degrees of flexion. Extension La degrees    Pain with ROM: Yes with deeper flexion    There is Mild effusion.      Mal-alignment: No    Tender to the palpation of Medial femoral condyle and Medial joint line    Neurovascular Status: Sensation Intact, Moves foot and ankle up & down, 2+ dorsalis pedis and negative homans sign    Stability:Varus/Valgus- Yes, stable    Quad strength: improving    Imaging: Left knee x-rays performed December 27, 2022 showed:  1. Implants are well aligned. Implants are well fixed. Patella is well positioned. Patient will follow up with Dr. Tonja Maloney 3 months status post surgery date. She is doing well. Will contact the office should she have any concerns.   Provider:   ESTEPHANIA Benitez

## 2023-02-02 ENCOUNTER — HOSPITAL ENCOUNTER (OUTPATIENT)
Dept: PHYSICAL THERAPY | Age: 58
Setting detail: THERAPIES SERIES
Discharge: HOME | End: 2023-02-02
Payer: COMMERCIAL

## 2023-02-02 PROCEDURE — 97110 THERAPEUTIC EXERCISES: CPT

## 2023-02-02 ASSESSMENT — PAIN DESCRIPTION - LOCATION: LOCATION: KNEE

## 2023-02-02 ASSESSMENT — PAIN DESCRIPTION - ORIENTATION: ORIENTATION: LEFT

## 2023-02-02 NOTE — PROGRESS NOTES
[address] [facility]  Outpatient Physical Therapy    Treatment Note        Date: 2023  Patient: Akin Taylor  : 1965   Confirmed: Yes  MRN: [id number]  Referring Provider: Elaine Bay MD    Medical Diagnosis: Primary osteoarthritis of left knee [M17.12]       Treatment Diagnosis: L knee pain, L knee edema, decreased L knee ROM, decreased LLE strength, impaired gait and functional mobility s/p TKA    Visit Information:  Insurance: Payor: Ronnie Peterson 150 / Plan: BCBS - [place] PPO / Product Type: *No Product type* /   PT Visit Information  Onset Date: 22  PT Insurance Information: BCBC  Total # of Visits Approved: 50  Total # of Visits to Date: 10  No Show: 0  Canceled Appointment: 0  Progress Note Counter: 10/12    Subjective Information:  Subjective: Pt reports she saw ortho and was recommended to continue therapy. Pt scheduled to RTD 3/16/23. Pt expresses the back of her L knee still feels tight. HEP Compliance:  [x] Good [] Fair [] Poor [] Reports not doing due to:    Pain Screening  Patient Currently in Pain: No  Pain Location: Knee  Pain Orientation: Left    Treatment:  Exercises:  Exercises  Exercise 1: supine HS stretch w/ band: 20\" x 10  Exercise 2: supine knee flex/ext (5 reps), follow by static knee flexion stretch (10\") - 8 cycles  Exercise 3: SAQ: ochoa simultaneous, x 20  Exercise 4: prone hang with intermittent knee flexion: x 4 min  Exercise 5: apollo leg press, 1st plate, single leg: 6 x 6 bilaterally (medial knee collapse noted)  Exercise 6: banded side stepping: RTB above knee: 10 steps R/L, 3 laps  Exercise 7: bike: seat 3, L1 x 5 min       Objective Measures:   NT    Assessment: Body Structures, Functions, Activity Limitations Requiring Skilled Therapeutic Intervention: Increased pain, Decreased ROM, Decreased strength, Decreased functional mobility , Decreased balance, Decreased high-level IADLs  Assessment: Cont'd exercises to improve L knee ROM and strength.  LLE weakness still displayed. Ongoing knee edema still restricts active terminal knee extension. Pt advised to continue icing and stretches to maximize ROM. Treatment Diagnosis: L knee pain, L knee edema, decreased L knee ROM, decreased LLE strength, impaired gait and functional mobility s/p TKA  Therapy Prognosis: Good          Post-Pain Assessment:       Pain Rating (0-10 pain scale):  0 /10   Location and pain description same as pre-treatment unless indicated. Action: [] NA   [x] Perform HEP  [] Meds as prescribed  [x] Modalities as prescribed   [] Call Physician     GOALS   Patient Goal(s): Patient Goals : walk normal, return to work    Long Term Goals Completed by 4-6 weeks Goal Status   LTG 1 L knee ROM 0-120 w/o pain to improve ability to squat for work duties In progress   LTG 2 Toi LE strength 5/5 to allow reciprocal stair climbing and squatting/kneeling for work duties In progress   LTG 3 Decreased L knee pain to < 2/10 with ADLs and ambulation In progress   LTG 4 90% PLOF or better via subjective report or functional survey In progress   LTG 5 [place] with HEP In progress          Plan:  Frequency/Duration:  Plan  Plan Frequency: 1-2  Plan weeks: 4-6  Current Treatment Recommendations: ROM, Strengthening, Balance training, Functional mobility training, Gait training, Stair training, Neuromuscular re-education, Manual, Pain management, Home exercise program, Safety education & training, Patient/Caregiver education & training, Equipment evaluation, education, & procurement, Modalities  Modalities: Heat/Cold, Vasopneumatic Device  Pt to continue current HEP. See objective section for any therapeutic exercise changes, additions or modifications this date.     Therapy Time:      PT Individual Minutes  Time In: 1115  Time Out: 6259  Minutes: 43  Timed Code Treatment Minutes: 40 Minutes  Procedure Minutes: 0  Timed Activity Minutes Units   Ther Ex 40 3     Electronically signed by Maliha Estrada PT on 2/2/23 at 12:13 PM EST

## 2023-02-08 ENCOUNTER — HOSPITAL ENCOUNTER (OUTPATIENT)
Dept: PHYSICAL THERAPY | Age: 58
Setting detail: THERAPIES SERIES
Discharge: HOME | End: 2023-02-08
Payer: COMMERCIAL

## 2023-02-08 PROCEDURE — 97110 THERAPEUTIC EXERCISES: CPT

## 2023-02-08 NOTE — PROGRESS NOTES
[address] [facility]  Outpatient Physical Therapy    Treatment Note        Date: 2023  Patient: Rd Bingham  : 1965   Confirmed: Yes  MRN: [id number]  Referring Provider: Ronny Barker MD    Medical Diagnosis: Primary osteoarthritis of left knee [M17.12]       Treatment Diagnosis: L knee pain, L knee edema, decreased L knee ROM, decreased LLE strength, impaired gait and functional mobility s/p TKA    Visit Information:  Insurance: Payor: Derl Diamante / Plan: BC - [place] PPO / Product Type: *No Product type* /   PT Visit Information  Onset Date: 22  PT Insurance Information: BCBC  Total # of Visits Approved: 50  Total # of Visits to Date:   No Show: 0  Canceled Appointment: 0  Progress Note Counter:     Subjective Information:  Subjective: I am still having trouble with the stairs coming down them. ( aggitation posterior knee)  HEP Compliance:  [x] Good [] Fair [] Poor [] Reports not doing due to:    Pain Screening  Patient Currently in Pain: Denies    Treatment:  Exercises:  Exercises  Exercise 1: supine HS stretch w/ band: 20\" x 10 ( at stairs this date 30\" x 3)  Exercise 2: supine knee flex/ext (5 reps), follow by static knee flexion stretch (10\") - 8 cycles  Exercise 3: SAQ: ochoa simultaneous, x 20  Exercise 4: prone hang with intermittent knee flexion: x 4 min  Exercise 5: apollo leg press, 1st plate, single leg: 6 x 6 bilaterally (medial knee collapse noted)  Exercise 6: banded side stepping: RTB above knee: 10 steps R/L, 3 laps  Exercise 7: bike: seat 3, L1 x 5 min  Exercise 10: MIni squats x 15  Exercise 11: eccentric lowering 4\" step. significant decrease in WBing at end range offloading onto opposite foot. *Indicates exercise, modality, or manual techniques to be initiated when appropriate    Objective Measures:     Functionally observed inability to get to 0° of knee extension    Assessment:    Body Structures, Functions, Activity Limitations Requiring Skilled Therapeutic Intervention: Increased pain, Decreased ROM, Decreased strength, Decreased functional mobility , Decreased balance, Decreased high-level IADLs  Assessment: Introduced new activities this date to improve strength and form with weightbearing unilateral activities with good tolerance Pt demonstrates weakness throughout the quad this date will continue to progress as able. Provided RTB this date for at home resistance with HEP based activities. Treatment Diagnosis: L knee pain, L knee edema, decreased L knee ROM, decreased LLE strength, impaired gait and functional mobility s/p TKA  Therapy Prognosis: Good       Patient Education: [x] NA       Post-Pain Assessment:       Pain Rating (0-10 pain scale):   3 /10 tightness posterior leg  Location and pain description same as pre-treatment unless indicated.    Action: [] NA   [] Perform HEP  [] Meds as prescribed  [] Modalities as prescribed   [] Call Physician     GOALS   Patient Goal(s): Patient Goals : walk normal, return to work         Long Term Goals Completed by 4-6 weeks Goal Status   LTG 1 L knee ROM 0-120 w/o pain to improve ability to squat for work duties In progress   LTG 2 Toi LE strength 5/5 to allow reciprocal stair climbing and squatting/kneeling for work duties In progress   LTG 3 Decreased L knee pain to < 2/10 with ADLs and ambulation In progress   LTG 4 90% PLOF or better via subjective report or functional survey In progress   LTG 5 [place] with HEP In progress        Plan:  Frequency/Duration:  Plan  Plan Frequency: 1-2  Plan weeks: 4-6  Current Treatment Recommendations: ROM, Strengthening, Balance training, Functional mobility training, Gait training, Stair training, Neuromuscular re-education, Manual, Pain management, Home exercise program, Safety education & training, Patient/Caregiver education & training, Equipment evaluation, education, & procurement, Modalities  Modalities: Heat/Cold, Vasopneumatic Device  Pt to continue current HEP. See objective section for any therapeutic exercise changes, additions or modifications this date.     Therapy Time:      PT Individual Minutes  Time In: 1001  Time Out: 2477  Minutes: 44  Timed Code Treatment Minutes: 44 Minutes  Procedure Minutes:0  Timed Activity Minutes Units   Ther Ex 44 3     Electronically signed by Yesenia Naidu PTA on 2/8/23 at 10:45 AM EST

## 2023-02-09 ENCOUNTER — HOSPITAL ENCOUNTER (OUTPATIENT)
Dept: PHYSICAL THERAPY | Age: 58
Setting detail: THERAPIES SERIES
Discharge: HOME | End: 2023-02-09
Payer: COMMERCIAL

## 2023-02-09 PROCEDURE — 97140 MANUAL THERAPY 1/> REGIONS: CPT

## 2023-02-09 PROCEDURE — 97110 THERAPEUTIC EXERCISES: CPT

## 2023-02-09 ASSESSMENT — PAIN DESCRIPTION - ORIENTATION: ORIENTATION: LEFT

## 2023-02-09 ASSESSMENT — PAIN SCALES - GENERAL: PAINLEVEL_OUTOF10: 4

## 2023-02-09 ASSESSMENT — PAIN DESCRIPTION - LOCATION: LOCATION: KNEE

## 2023-02-09 ASSESSMENT — PAIN DESCRIPTION - DESCRIPTORS: DESCRIPTORS: SORE

## 2023-02-09 NOTE — PROGRESS NOTES
[address] [facility]  Outpatient Physical Therapy    Treatment Note        Date: 2023  Patient: Tammie Prater  : 1965   Confirmed: Yes  MRN: [id number]  Referring Provider: Malena Martinez MD    Medical Diagnosis: Primary osteoarthritis of left knee [M17.12]       Treatment Diagnosis: L knee pain, L knee edema, decreased L knee ROM, decreased LLE strength, impaired gait and functional mobility s/p TKA    Visit Information:  Insurance: Payor: Ronnie Peterson 150 / Plan: BCBS - [place] PPO / Product Type: *No Product type* /   PT Visit Information  Onset Date: 22  PT Insurance Information: [facility] [place]  Total # of Visits Approved: 50  Total # of Visits to Date:   No Show: 0  Canceled Appointment: 0  Progress Note Counter:     Subjective Information:  Subjective: Pt reports she is sore from exercising yesterday and being active on her feet for a large portion of the afternoon.   HEP Compliance:  [x] Good [] Fair [] Poor [] Reports not doing due to:    Pain Screening  Patient Currently in Pain: Yes  Pain Assessment: 0-10  Pain Level: 4  Pain Location: Knee  Pain Orientation: Left  Pain Descriptors: Sore    Treatment:  Exercises:  Exercises  Exercise 1: ROM/strength measures  Exercise 2: step-up march: 12\" box, 2x10 ochoa  Exercise 3: step-up with unilateral KB hold: 12\" box, 10# KB x 15 ochoa  Exercise 4: elliptical: L1 x 6 min       Manual:   Manual Therapy  Soft Tissue Mobilizaton: retrograde massage, posterior L knee/leg x 5 min; incision XF massage x 5 min       *Indicates exercise, modality, or manual techniques to be initiated when appropriate    Objective Measures:       LTG 1 Current Status[de-identified] 23: L knee: 0-115 AROM, 0-119 PROM2023  LTG 2 Current Status[de-identified] 2023: L knee: flex: 4+/5, ext: 4+/5, hip ABD/ER: 4+/5  LTG 3 Current Status[de-identified] 23: verbalizes mostly soreness after increased activity  LTG 4 Current Status[de-identified] 23: LEFS: 55/80  LTG 5 Current Status[de-identified] 23: progressions ongoing for RTW specific demands (squating, kneeling, climbing, lifting)          Assessment: Body Structures, Functions, Activity Limitations Requiring Skilled Therapeutic Intervention: Increased pain, Decreased ROM, Decreased strength, Decreased functional mobility , Decreased balance, Decreased high-level IADLs  Assessment: L knee ROM continues to improve. LLE strength improving as well, allowing improved tolerance to squats, walking, stairs, and lifting. 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 Treatment Diagnosis: L knee pain, L knee edema, decreased L knee ROM, decreased LLE strength, impaired gait and functional mobility s/p TKA  Therapy Prognosis: Good          Post-Pain Assessment:       Pain Rating (0-10 pain scale):  0 /10   Location and pain description same as pre-treatment unless indicated.    Action: [] NA   [x] Perform HEP  [] Meds as prescribed  [x] Modalities as prescribed   [] Call Physician     GOALS   Patient Goal(s): Patient Goals : walk normal, return to work    Long Term Goals Completed by 4-6 weeks Goal Status   LTG 1 L knee ROM 0-120 w/o pain to improve ability to squat for work duties In progress   LTG 2 Toi LE strength 5/5 to allow reciprocal stair climbing and squatting/kneeling for work duties In progress   LTG 3 Decreased L knee pain to < 2/10 with ADLs and ambulation In progress   LTG 4 90% PLOF or better via subjective report or functional survey In progress   LTG 5 [place] with HEP In progress          Plan:  Frequency/Duration:  Plan  Plan Frequency: 1-2  Plan weeks: 4  Current Treatment Recommendations: ROM, Strengthening, Balance training, Functional mobility training, Gait training, Stair training, Neuromuscular re-education, Manual, Pain management, Home exercise program, Safety education & training, Patient/Caregiver education & training, Equipment evaluation, education, & procurement, Modalities  Modalities: Heat/Cold, Vasopneumatic Device  Additional Comments: continue current POC  Pt to continue current HEP. See objective section for any therapeutic exercise changes, additions or modifications this date.     Therapy Time:      PT Individual Minutes  Time In: 1115  Time Out: 1155  Minutes: 40  Timed Code Treatment Minutes: 40 Minutes  Procedure Minutes: 0  Timed Activity Minutes Units   Ther Ex 30 2   Manual  10 1     Electronically signed by Ana Luisa Lantigua PT on 2/9/23 at 12:00 PM EST

## 2023-02-09 NOTE — PROGRESS NOTES
[address]  PHYSICAL THERAPY PLAN OF CARE   [address], [id number] [place] [address]         Ph: [phone]  Fax: [phone]    [] Certification  [] Recertification []  Plan of Care  [x] Progress Note [] Discharge      Referring Provider: Jennifer Wiley MD     From:  Isreal Alcantara, PT, DPT  Patient: Nevin Lara (59 y.o. female) : 1965 Date: 2023  Medical Diagnosis: Primary osteoarthritis of left knee [M17.12]       Treatment Diagnosis: L knee pain, L knee edema, decreased L knee ROM, decreased LLE strength, impaired gait and functional mobility s/p TKA      Progress Report Period from:  2023  to 2023    Visits to Date: 12 No Show: 0 Cancelled Appts: 0    OBJECTIVE:   Long Term Goals - Time Frame for Long Term Goals : 4-6 weeks  Goals Current/ Discharge status Status   Long Term Goal 1: L knee ROM 0-120 w/o pain to improve ability to squat for work duties LTG 1 Current Status[de-identified] 23: L knee: 0-115 AROM, 0-119 PROM2023   In progress   Long Term Goal 2: Toi LE strength 5/5 to allow reciprocal stair climbing and squatting/kneeling for work duties LTG 2 Current Status[de-identified] 2023: L knee: flex: 4+/5, ext: 4+/5, hip ABD/ER: 4+/5   In progress   Long Term Goal 3: Decreased L knee pain to < 2/10 with ADLs and ambulation LTG 3 Current Status[de-identified] 23: verbalizes mostly soreness after increased activity   In progress   Long Term Goal 4: 90% PLOF or better via subjective report or functional survey LTG 4 Current Status[de-identified] 23: LEFS: 55/80   In progress   Long Term Goal 5:  [place] with HEP LTG 5 Current Status[de-identified] 23: progressions ongoing for RTW specific demands (squating, kneeling, climbing, lifting)   In progress     Body Structures, Functions, Activity Limitations Requiring Skilled Therapeutic Intervention: Increased pain, Decreased ROM, Decreased strength, Decreased functional mobility , Decreased balance, Decreased high-level IADLs  Assessment: L knee ROM continues to improve. LLE strength improving as well, allowing improved tolerance to squats, walking, stairs, and lifting. 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 Therapy Prognosis: Good       PLAN:   Frequency/Duration:  Plan Frequency: 1-2  Plan weeks: 4  Current Treatment Recommendations: ROM, Strengthening, Balance training, Functional mobility training, Gait training, Stair training, Neuromuscular re-education, Manual, Pain management, Home exercise program, Safety education & training, Patient/Caregiver education & training, Equipment evaluation, education, & procurement, Modalities  Modalities: Heat/Cold, Vasopneumatic Device  Additional Comments: continue current POC     Precautions:  s/p L TKA 12/12/22                          Patient Status:[x] Continue/ Initiate plan of Care     [] Discharge PT. Recommend pt continue with HEP. [] Additional visits requested, Please re-certify for additional visits:     [] Hold        Signature: Electronically signed by Ricarda Shepherd PT on 2/9/23 at 12:04 PM EST      If you have any questions or concerns, please don't hesitate to call. Thank you for your referral.    I have reviewed this plan of care and certify a need for medically necessary rehabilitation services.     Physician Signature:__________________________________________________________  Date:  Please sign and return

## 2023-02-13 ENCOUNTER — HOSPITAL ENCOUNTER (OUTPATIENT)
Dept: PHYSICAL THERAPY | Age: 58
Setting detail: THERAPIES SERIES
Discharge: HOME | End: 2023-02-13
Payer: COMMERCIAL

## 2023-02-13 PROCEDURE — 97110 THERAPEUTIC EXERCISES: CPT

## 2023-02-13 ASSESSMENT — PAIN DESCRIPTION - DESCRIPTORS: DESCRIPTORS: SORE;TIGHTNESS

## 2023-02-13 ASSESSMENT — PAIN DESCRIPTION - ORIENTATION: ORIENTATION: LEFT;POSTERIOR

## 2023-02-13 ASSESSMENT — PAIN SCALES - GENERAL: PAINLEVEL_OUTOF10: 3

## 2023-02-13 ASSESSMENT — PAIN DESCRIPTION - LOCATION: LOCATION: KNEE

## 2023-02-13 NOTE — PROGRESS NOTES
[address] [facility]  Outpatient Physical Therapy    Treatment Note        Date: 2023  Patient: Shaina Gresham  : 1965   Confirmed: Yes  MRN: [id number]  Referring Provider: Dolly Olivo MD    Medical Diagnosis: Primary osteoarthritis of left knee [M17.12]       Treatment Diagnosis: L knee pain, L knee edema, decreased L knee ROM, decreased LLE strength, impaired gait and functional mobility s/p TKA    Visit Information:  Insurance: Payor: Amara Garcia / Plan: BC - [place] PPO / Product Type: *No Product type* /   PT Visit Information  Onset Date: 22  PT Insurance Information: BCBC  Total # of Visits Approved: 50  Total # of Visits to Date:   No Show: 0  Canceled Appointment: 0  Progress Note Counter:     Subjective Information:  Subjective: Pt reports tightness behind her L knee with mild soreness. HEP Compliance:  [x] Good [] Fair [] Poor [] Reports not doing due to:    Pain Screening  Patient Currently in Pain: Yes  Pain Assessment: 0-10  Pain Level: 3  Pain Location: Knee  Pain Orientation: Left, Posterior  Pain Descriptors: Sore, Tightness    Treatment:  Exercises:  Exercises  Exercise 1: elliptical: L1 x 6 min (3 fwd/3 retro)  Exercise 2: gastroc stretch w/ SB: 10\" x 10  Exercise 3: standing TKE: RTB, 10\" hold x 15  Exercise 4: controlled step-downs (eccentric focus): 4\" box x 10 ochoa; 6\" box  Exercise 5: 12\" ranjit step overs: LLE lead x 3 laps, RLE lead x 3 laps, lateral x 3 laps  Exercise 6: S/L hip circles: fwd 2x10, retro 2x10  Exercise 7: bridge: 5\" x 10; SL bridge 2 x 5 ochoa       Modalities:   Declined by patient       *Indicates exercise, modality, or manual techniques to be initiated when appropriate    Objective Measures:   NT    Assessment:    Body Structures, Functions, Activity Limitations Requiring Skilled Therapeutic Intervention: Increased pain, Decreased ROM, Decreased strength, Decreased functional mobility , Decreased balance, Decreased high-level IADLs  Assessment: Cont'd LE strengthening to improve symmetry between extremities. Good tolerance to unilateral activities. Mild pain and decreased eccentric control displayed during step downs on LLE. Overall, pt progressing toward strength goal. Advised pt to continue use of ice and stretching as needed for post exertional soreness. Treatment Diagnosis: L knee pain, L knee edema, decreased L knee ROM, decreased LLE strength, impaired gait and functional mobility s/p TKA  Therapy Prognosis: Good          Post-Pain Assessment:       Pain Rating (0-10 pain scale):   0/10   Location and pain description same as pre-treatment unless indicated. Action: [x] NA   [] Perform HEP  [] Meds as prescribed  [x] Modalities as prescribed   [] Call Physician     GOALS   Patient Goal(s): Patient Goals : walk normal, return to work    Long Term Goals Completed by 4-6 weeks Goal Status   LTG 1 L knee ROM 0-120 w/o pain to improve ability to squat for work duties In progress   LTG 2 Toi LE strength 5/5 to allow reciprocal stair climbing and squatting/kneeling for work duties In progress   LTG 3 Decreased L knee pain to < 2/10 with ADLs and ambulation In progress   LTG 4 90% PLOF or better via subjective report or functional survey In progress   LTG 5 [place] with HEP In progress          Plan:  Frequency/Duration:  Plan  Plan Frequency: 1-2  Plan weeks: 4  Current Treatment Recommendations: ROM, Strengthening, Balance training, Functional mobility training, Gait training, Stair training, Neuromuscular re-education, Manual, Pain management, Home exercise program, Safety education & training, Patient/Caregiver education & training, Equipment evaluation, education, & procurement, Modalities  Modalities: Heat/Cold, Vasopneumatic Device  Additional Comments: continue current POC  Pt to continue current HEP. See objective section for any therapeutic exercise changes, additions or modifications this date.     Therapy Time:      PT Individual Minutes  Time In: 7479  Time Out: 1200  Minutes: 46  Timed Code Treatment Minutes: 45 Minutes  Procedure Minutes: 0  Timed Activity Minutes Units   Ther Ex 45 3     Electronically signed by Mirza Pimentel PT on 2/13/23 at 12:02 PM EST

## 2023-02-16 ENCOUNTER — HOSPITAL ENCOUNTER (OUTPATIENT)
Dept: PHYSICAL THERAPY | Age: 58
Setting detail: THERAPIES SERIES
Discharge: HOME | End: 2023-02-16
Payer: COMMERCIAL

## 2023-02-16 PROCEDURE — 97110 THERAPEUTIC EXERCISES: CPT

## 2023-02-16 ASSESSMENT — PAIN DESCRIPTION - ORIENTATION: ORIENTATION: LEFT;POSTERIOR

## 2023-02-16 ASSESSMENT — PAIN SCALES - GENERAL: PAINLEVEL_OUTOF10: 3

## 2023-02-16 ASSESSMENT — PAIN DESCRIPTION - LOCATION: LOCATION: KNEE

## 2023-02-16 ASSESSMENT — PAIN DESCRIPTION - DESCRIPTORS: DESCRIPTORS: SORE;TIGHTNESS

## 2023-02-16 NOTE — PROGRESS NOTES
[address] [facility]  Outpatient Physical Therapy    Treatment Note        Date: 2023  Patient: Dewayne Barboza  : 1965   Confirmed: Yes  MRN: [id number]  Referring Provider: Aimee Harding MD    Medical Diagnosis: Primary osteoarthritis of left knee [M17.12]       Treatment Diagnosis: L knee pain, L knee edema, decreased L knee ROM, decreased LLE strength, impaired gait and functional mobility s/p TKA    Visit Information:  Insurance: Payor: Michael Mediate / Plan: BCBS - [place] PPO / Product Type: *No Product type* /   PT Visit Information  Onset Date: 22  PT Insurance Information: BCBC  Total # of Visits Approved: 50  Total # of Visits to Date:   No Show: 0  Canceled Appointment: 0  Progress Note Counter:     Subjective Information:  Subjective: Patient reports tightness and mild soreness on the lateral side of the L knee. HEP Compliance:  [x] Good [] Fair [] Poor [] Reports not doing due to:    Pain Screening  Patient Currently in Pain: Yes  Pain Assessment: 0-10  Pain Level: 3  Pain Location: Knee  Pain Orientation: Left, Posterior  Pain Descriptors: Sore, Tightness    Treatment:  Exercises:  Exercises  Exercise 1: elliptical: L1 x 6 min (3 fwd/3 retro)  Exercise 2: Lunge to half bosu x 10 B/L  Exercise 3: Split Leg DL w. KB x 10 (15#)  Exercise 4: KB DL x 10 (15#)  Exercise 5: step-up march: 12\" box, 2x10 ochoa  Exercise 6: S/L hip circles: fwd 2x10, retro 2x10  Exercise 7: SL bridge 2 x 10 ochoa     Objective Measures:         LTG 1 Current Status[de-identified] 23: L knee: 0-118 AROM,     Assessment: Body Structures, Functions, Activity Limitations Requiring Skilled Therapeutic Intervention: Increased pain, Decreased ROM, Decreased strength, Decreased functional mobility , Decreased balance, Decreased high-level IADLs  Assessment: Initiated additional exercises this date to continue to strengthen LLE and knee stability.  Also initiated functional activities like like split leg DL and dianna HERR to improve patient functional mobility when RTW. Patient noted no increased pain throughout session and demonstrated an increase in L knee flexion, measuring to 118 deg. Treatment Diagnosis: L knee pain, L knee edema, decreased L knee ROM, decreased LLE strength, impaired gait and functional mobility s/p TKA  Therapy Prognosis: Good     Post-Pain Assessment:       Pain Rating (0-10 pain scale):  2-3 /10   Location and pain description same as pre-treatment unless indicated. Action: [] NA   [x] Perform HEP  [] Meds as prescribed  [] Modalities as prescribed   [] Call Physician     GOALS   Patient Goal(s): Patient Goals : walk normal, return to work    Long Term Goals Completed by 4-6 weeks Goal Status   LTG 1 L knee ROM 0-120 w/o pain to improve ability to squat for work duties In progress   LTG 2 Toi LE strength 5/5 to allow reciprocal stair climbing and squatting/kneeling for work duties In progress   LTG 3 Decreased L knee pain to < 2/10 with ADLs and ambulation In progress   LTG 4 90% PLOF or better via subjective report or functional survey In progress   LTG 5 [place] with HEP In progress   Plan:  Frequency/Duration:  Plan  Plan Frequency: 1-2  Plan weeks: 4  Current Treatment Recommendations: ROM, Strengthening, Balance training, Functional mobility training, Gait training, Stair training, Neuromuscular re-education, Manual, Pain management, Home exercise program, Safety education & training, Patient/Caregiver education & training, Equipment evaluation, education, & procurement, Modalities  Modalities: Heat/Cold, Vasopneumatic Device  Additional Comments: continue current POC  Pt to continue current HEP. See objective section for any therapeutic exercise changes, additions or modifications this date.     Therapy Time:      PT Individual Minutes  Time In: 1116  Time Out: 5114  Minutes: 40  Timed Code Treatment Minutes: 40 Minutes  Procedure Minutes: 0  Timed Activity Minutes Units   Ther Ex 40 3   Electronically signed by Sajan Borrego PTA on 2/16/23 at 11:59 AM EST

## 2023-02-20 ENCOUNTER — HOSPITAL ENCOUNTER (OUTPATIENT)
Dept: PHYSICAL THERAPY | Age: 58
Setting detail: THERAPIES SERIES
Discharge: HOME | End: 2023-02-20
Payer: COMMERCIAL

## 2023-02-20 PROCEDURE — 97110 THERAPEUTIC EXERCISES: CPT

## 2023-02-20 ASSESSMENT — PAIN DESCRIPTION - DESCRIPTORS: DESCRIPTORS: TIGHTNESS

## 2023-02-20 ASSESSMENT — PAIN DESCRIPTION - ORIENTATION: ORIENTATION: LEFT;POSTERIOR

## 2023-02-20 ASSESSMENT — PAIN DESCRIPTION - LOCATION: LOCATION: KNEE

## 2023-02-20 ASSESSMENT — PAIN SCALES - GENERAL: PAINLEVEL_OUTOF10: 2

## 2023-02-20 NOTE — PROGRESS NOTES
[address] [facility]  Outpatient Physical Therapy    Treatment Note        Date: 2023  Patient: Jessica Andrew  : 1965   Confirmed: Yes  MRN: [id number]  Referring Provider: Alona Schwartz MD    Medical Diagnosis: Primary osteoarthritis of left knee [M17.12]       Treatment Diagnosis: L knee pain, L knee edema, decreased L knee ROM, decreased LLE strength, impaired gait and functional mobility s/p TKA    Visit Information:  Insurance: Payor: Ronnie Peterson 150 / Plan: BCBS - [place] PPO / Product Type: *No Product type* /   PT Visit Information  Onset Date: 22  PT Insurance Information: [facility] [place]  Total # of Visits Approved: 50  Total # of Visits to Date: 15  No Show: 0  Canceled Appointment: 0  Progress Note Counter: 3/8    Subjective Information:  Subjective: Patient reports mild pain and L knee has been feeling ok over the weekend. HEP Compliance:  [x] Good [] Fair [] Poor [] Reports not doing due to:    Pain Screening  Patient Currently in Pain: Yes  Pain Assessment: 0-10  Pain Level: 2  Pain Location: Knee  Pain Orientation: Left, Posterior  Pain Descriptors: Tightness    Treatment:  Exercises:  Exercises  Exercise 1: Bike seat 3, Level 5 x 6 minutes  Exercise 2: Lunge to half bosu x 10 B/L (forward and lateral)  Exercise 3: Split Leg DL w. KB x 10 (15#)  Exercise 4: KB DL x 10 (15#)  Exercise 5: Stairs 4 steps x 3  Exercise 7: SL bridge x 15 ochoa  Exercise 9: banded side stepping: RTB at ankles 2 x 30ft; monster walks 2 x 30ft  Exercise 10: Squats x 15 ~ 90*  Exercise 11: eccentric lowering 4\" step. x 10  Exercise 20: HEP: Cont Current   Objective Measures:      LTG 1 Current Status[de-identified] 23: L knee: 0-120 AROM,     Assessment:    Body Structures, Functions, Activity Limitations Requiring Skilled Therapeutic Intervention: Increased pain, Decreased ROM, Decreased strength, Decreased functional mobility , Decreased balance, Decreased high-level IADLs  Assessment: Continued to focus on dynamic LE movements to simulate work activties. Patient demonstrated improving technique with lunges as well as squats and split leg variations. Patient displayed an increase of Rt knee to 120 deg this visit with mild tightness behind the knee. Treatment Diagnosis: L knee pain, L knee edema, decreased L knee ROM, decreased LLE strength, impaired gait and functional mobility s/p TKA  Therapy Prognosis: Good       Post-Pain Assessment:       Pain Rating (0-10 pain scale): 0  /10   Location and pain description same as pre-treatment unless indicated. Action: [x] NA   [x] Perform HEP  [] Meds as prescribed  [] Modalities as prescribed   [] Call Physician     GOALS   Patient Goal(s): Patient Goals : walk normal, return to work    Long Term Goals Completed by 4-6 weeks Goal Status   LTG 1 L knee ROM 0-120 w/o pain to improve ability to squat for work duties In progress   LTG 2 Toi LE strength 5/5 to allow reciprocal stair climbing and squatting/kneeling for work duties In progress   LTG 3 Decreased L knee pain to < 2/10 with ADLs and ambulation In progress   LTG 4 90% PLOF or better via subjective report or functional survey In progress   LTG 5 [place] with HEP In progress     Plan:  Frequency/Duration:  Plan  Plan Frequency: 1-2  Plan weeks: 4  Current Treatment Recommendations: ROM, Strengthening, Balance training, Functional mobility training, Gait training, Stair training, Neuromuscular re-education, Manual, Pain management, Home exercise program, Safety education & training, Patient/Caregiver education & training, Equipment evaluation, education, & procurement, Modalities  Modalities: Heat/Cold, Vasopneumatic Device  Additional Comments: continue current POC  Pt to continue current HEP. See objective section for any therapeutic exercise changes, additions or modifications this date.     Therapy Time:      PT Individual Minutes  Time In: 6310  Time Out: 4280  Minutes: 45  Timed Code Treatment Minutes: 45 Minutes  Procedure Minutes: 0  Timed Activity Minutes Units   Ther Ex 45 3   Electronically signed by Kingston Morales PTA on 2/20/23 at 12:01 PM EST

## 2023-02-23 ENCOUNTER — HOSPITAL ENCOUNTER (OUTPATIENT)
Dept: PHYSICAL THERAPY | Age: 58
Setting detail: THERAPIES SERIES
Discharge: HOME | End: 2023-02-23
Payer: COMMERCIAL

## 2023-02-23 PROCEDURE — 97110 THERAPEUTIC EXERCISES: CPT

## 2023-02-23 NOTE — PROGRESS NOTES
[address] [facility]  Outpatient Physical Therapy    Treatment Note        Date: 2023  Patient: Madelyn Mendez  : 1965   Confirmed: Yes  MRN: [id number]  Referring Provider: Ilene Pabon MD    Medical Diagnosis: Primary osteoarthritis of left knee [M17.12]       Treatment Diagnosis: L knee pain, L knee edema, decreased L knee ROM, decreased LLE strength, impaired gait and functional mobility s/p TKA    Visit Information:  Insurance: Payor: Ronnie Peterson 150 / Plan: BCBS - [place] PPO / Product Type: *No Product type* /   PT Visit Information  Onset Date: 22  PT Insurance Information: BCBC  Total # of Visits Approved: 50  Total # of Visits to Date:   No Show: 0  Canceled Appointment: 0  Progress Note Counter:     Subjective Information:  Subjective: Pt states no pain this date but notes tightness in the posterior knee. HEP Compliance:  [x] Good [] Fair [] Poor [] Reports not doing due to:    Pain Screening  Patient Currently in Pain: Denies    Treatment:  Exercises:  Exercises  Exercise 1: Eliptical L2 x 6 minutes  Exercise 2: Sled push/pull 2 x 30 ft (empty)  Exercise 3: Split Leg DL w. KB 2 x 10 ochoa (10#)  Exercise 6: Standing Hamstring stretch at stairs. 3 x 30s  Exercise 8: apollo leg press 2x 10 ochoa LE, 1st plate, single leg: 2 x 5  Exercise 10: Squats to mat x 15 ~ 90*  Exercise 11: eccentric lowering- heel taps 4\" step. 2 x 10  Exercise 20: HEP: Cont Current       Manual:   Manual Therapy  Soft Tissue Mobilizaton: Posterior L knee at hamstring insertions. x 5 minutes   Objective Measures:      LTG 1 Current Status[de-identified] 23: L knee: 0-120 AROM,        Assessment:    Body Structures, Functions, Activity Limitations Requiring Skilled Therapeutic Intervention: Increased pain, Decreased ROM, Decreased strength, Decreased functional mobility , Decreased balance, Decreased high-level IADLs  Assessment: Progressed patient through additional functional activities that imitate work duties with patient displaying good tolerance. Patient noted no increased pain with split leg squats and squats to 90 deg this date. Good tolerance with no pain noted when performing sled pushes and pulls. Patient continues to demonstrate increased function in left knee and is performing exercises that simulate work duties with good tolerance and safety. Treatment Diagnosis: L knee pain, L knee edema, decreased L knee ROM, decreased LLE strength, impaired gait and functional mobility s/p TKA  Therapy Prognosis: Good     Post-Pain Assessment:       Pain Rating (0-10 pain scale): 0  /10   Location and pain description same as pre-treatment unless indicated. Action: [] NA   [x] Perform HEP  [] Meds as prescribed  [] Modalities as prescribed   [] Call Physician     GOALS   Patient Goal(s): Patient Goals : walk normal, return to work    Long Term Goals Completed by 4-6 weeks Goal Status   LTG 1 L knee ROM 0-120 w/o pain to improve ability to squat for work duties In progress   LTG 2 Toi LE strength 5/5 to allow reciprocal stair climbing and squatting/kneeling for work duties In progress   LTG 3 Decreased L knee pain to < 2/10 with ADLs and ambulation In progress   LTG 4 90% PLOF or better via subjective report or functional survey In progress   LTG 5 [place] with HEP In progress   Plan:  Frequency/Duration:  Plan  Plan Frequency: 1-2  Plan weeks: 4  Current Treatment Recommendations: ROM, Strengthening, Balance training, Functional mobility training, Gait training, Stair training, Neuromuscular re-education, Manual, Pain management, Home exercise program, Safety education & training, Patient/Caregiver education & training, Equipment evaluation, education, & procurement, Modalities  Modalities: Heat/Cold, Vasopneumatic Device  Additional Comments: continue current POC  Pt to continue current HEP. See objective section for any therapeutic exercise changes, additions or modifications this date.     Therapy Time:      PT Individual Minutes  Time In: 5141  Time Out: 1140  Minutes: 43  Timed Code Treatment Minutes: 43 Minutes  Procedure Minutes: 0  Timed Activity Minutes Units   Ther Ex 43 3   Electronically signed by Lashell Fuchs PTA on 2/23/23 at 11:58 AM EST

## 2023-02-27 ENCOUNTER — HOSPITAL ENCOUNTER (OUTPATIENT)
Dept: PHYSICAL THERAPY | Age: 58
Setting detail: THERAPIES SERIES
Discharge: HOME | End: 2023-02-27
Payer: COMMERCIAL

## 2023-02-27 PROCEDURE — 97110 THERAPEUTIC EXERCISES: CPT

## 2023-02-27 NOTE — PROGRESS NOTES
[address] [facility]  Outpatient Physical Therapy    Treatment Note        Date: 2023  Patient: Aniket Nobles  : 1965   Confirmed: Yes  MRN: [id number]  Referring Provider: Elsa Lynch MD    Medical Diagnosis: Primary osteoarthritis of left knee [M17.12]       Treatment Diagnosis: L knee pain, L knee edema, decreased L knee ROM, decreased LLE strength, impaired gait and functional mobility s/p TKA    Visit Information:  Insurance: Payor: Maria E Going / Plan: BCBS - [place] PPO / Product Type: *No Product type* /   PT Visit Information  Onset Date: 22  PT Insurance Information: BCBC  Total # of Visits Approved: 50  Total # of Visits to Date:   No Show: 0  Canceled Appointment: 0  Progress Note Counter:     Subjective Information:  Subjective: Pt reports \"I'm doing really good. \" Pt reports soreness following last visit for ~1 day. Pt reports she's hoping to RTW after her MD f/u on 3/16/23. HEP Compliance:  [x] Good [] Fair [] Poor [] Reports not doing due to:    Pain Screening  Patient Currently in Pain: Denies    Treatment:  Exercises:  Exercises  Exercise 1: Eliptical L2 x 6 minutes  Exercise 2: Sled push/pull 3 x 30 ft added 50#  Exercise 5: BOSU lunges F/L x10 ea B - VC's for technique, especially lateral  Exercise 6: Standing Hamstring stretch at stairs. 3 x 30s  Exercise 8: apollo leg press 2x 10 ochoa LE, 1st plate, single leg: 2 x 5  Exercise 10: box lift floor<->waist level (15# box + 10#) x10  Exercise 11: eccentric lowering- heel taps 6\" step. x 10  Exercise 20: HEP: Cont Current  Treatment Reasoning  Limitations addressed: Mobility, Strength      *Indicates exercise, modality, or manual techniques to be initiated when appropriate    Objective Measures:     Assessment:    Body Structures, Functions, Activity Limitations Requiring Skilled Therapeutic Intervention: Increased pain, Decreased ROM, Decreased strength, Decreased functional mobility , Decreased balance, Decreased high-level IADLs  Assessment: Cont to progress therex for increased strength and functional mobility related to work duties. Pt reporting difficulty w/ 6'' step downs, though able to tolerate. Pt also requiring cues for correct techniuqe w/ lunges w/ fair-good follow through. Pt reporting fatigue at end of session, though no increase in pain. Treatment Diagnosis: L knee pain, L knee edema, decreased L knee ROM, decreased LLE strength, impaired gait and functional mobility s/p TKA  Therapy Prognosis: Good       Patient Education: [] NA  Patient Education: discussed RTW activity and pt states she has to wear really heavy boots and she plans on buying a new pain this week and will bring them in to trial during treatment    Post-Pain Assessment:       Pain Rating (0-10 pain scale):   0/10   Location and pain description same as pre-treatment unless indicated.    Action: [] NA   [x] Perform HEP  [] Meds as prescribed  [] Modalities as prescribed   [] Call Physician     GOALS   Patient Goal(s): Patient Goals : walk normal, return to work      Long Term Goals Completed by 4-6 weeks Goal Status   LTG 1 L knee ROM 0-120 w/o pain to improve ability to squat for work duties In progress   LTG 2 Toi LE strength 5/5 to allow reciprocal stair climbing and squatting/kneeling for work duties In progress   LTG 3 Decreased L knee pain to < 2/10 with ADLs and ambulation In progress   LTG 4 90% PLOF or better via subjective report or functional survey In progress   LTG 5 [place] with HEP In progress          Plan:  Frequency/Duration:  Plan  Plan Frequency: 1-2  Plan weeks: 4  Current Treatment Recommendations: ROM, Strengthening, Balance training, Functional mobility training, Gait training, Stair training, Neuromuscular re-education, Manual, Pain management, Home exercise program, Safety education & training, Patient/Caregiver education & training, Equipment evaluation, education, & procurement, Modalities  Modalities: Heat/Cold, Vasopneumatic Device  Additional Comments: continue current POC  Pt to continue current HEP. See objective section for any therapeutic exercise changes, additions or modifications this date.     Therapy Time:      PT Individual Minutes  Time In: 9170  Time Out: 7768  Minutes: 40  Timed Code Treatment Minutes: 40 Minutes  Procedure Minutes: 0    Timed Activity Minutes Units   Ther Ex 40 3     Electronically signed by Morgan Fox PTA on 2/27/23 at 10:08 AM EST

## 2023-03-02 ENCOUNTER — HOSPITAL ENCOUNTER (OUTPATIENT)
Dept: PHYSICAL THERAPY | Age: 58
Setting detail: THERAPIES SERIES
Discharge: HOME | End: 2023-03-02
Payer: COMMERCIAL

## 2023-03-02 PROCEDURE — 97110 THERAPEUTIC EXERCISES: CPT

## 2023-03-02 NOTE — PROGRESS NOTES
[facility]  Outpatient Physical Therapy    Treatment Note        Date: 3/2/2023  Patient: Cheo Lay  : 1965   Confirmed: Yes  MRN: [id number]  Referring Provider: Viktoriya Sehr MD    Medical Diagnosis: Primary osteoarthritis of left knee [M17.12]       Treatment Diagnosis: L knee pain, L knee edema, decreased L knee ROM, decreased LLE strength, impaired gait and functional mobility s/p TKA    Visit Information:  Insurance: Payor: Ronnie Peterson 150 / Plan: BCBS - [place] PPO / Product Type: *No Product type* /   PT Visit Information  Onset Date: 22  PT Insurance Information: BCBC  Total # of Visits Approved: 50  Total # of Visits to Date:   No Show: 0  Canceled Appointment: 0  Progress Note Counter:     Subjective Information:  Subjective: I've been practicing at home on the treadmill. I'm looking for my boots, \"If I can find them I will bring them in. \"  HEP Compliance:  [x] Good [] Fair [] Poor [] Reports not doing due to:    Pain Screening  Patient Currently in Pain: Denies    Treatment:  Exercises:  Exercises  Exercise 1: Eliptical L2 x 6 minutes  Exercise 2: Sled push/pull 3 x 30 ft added 50#  Exercise 5: BOSU lunges F/L x10 ea B - VC's for technique, especially lateral  Exercise 6: Standing Hamstring stretch at stairs. 3 x 30s  Exercise 8: apollo leg press 2x 10 ochoa LE, 1st plate, single leg: 2 x 5  Exercise 10: box lift floor<->waist level (15# box + 10#) x10  Exercise 11: eccentric lowering- heel taps 6\" step. x 10  Exercise 12: suitcase carry for unilateral increased loading 3 laps 30' #10  Exercise 20: HEP: Cont Current  Treatment Reasoning  Limitations addressed: Mobility, Strength       *Indicates exercise, modality, or manual techniques to be initiated when appropriate    Objective Measures:         Assessment:    Body Structures, Functions, Activity Limitations Requiring Skilled Therapeutic Intervention: Increased pain, Decreased ROM, Decreased strength, Decreased functional mobility , Decreased balance, Decreased high-level IADLs  Assessment: Continued with current program this date with good results. Pt was able to tolerate all activities with increased control and improved form this date. Continue to progress as able  Treatment Diagnosis: L knee pain, L knee edema, decreased L knee ROM, decreased LLE strength, impaired gait and functional mobility s/p TKA  Therapy Prognosis: Good       Patient Education: [x] NA       Post-Pain Assessment:       Pain Rating (0-10 pain scale):  0 /10   Location and pain description same as pre-treatment unless indicated.   Action: [] NA   [] Perform HEP  [] Meds as prescribed  [] Modalities as prescribed   [] Call Physician     GOALS   Patient Goal(s): Patient Goals : walk normal, return to work         Long Term Goals Completed by 4-6 weeks Goal Status   LTG 1 L knee ROM 0-120 w/o pain to improve ability to squat for work duties In progress   LTG 2 Toi LE strength 5/5 to allow reciprocal stair climbing and squatting/kneeling for work duties In progress   LTG 3 Decreased L knee pain to < 2/10 with ADLs and ambulation In progress   LTG 4 90% PLOF or better via subjective report or functional survey In progress   LTG 5 [place] with HEP In progress               Plan:  Frequency/Duration:  Plan  Plan Frequency: 1-2  Plan weeks: 4  Current Treatment Recommendations: ROM, Strengthening, Balance training, Functional mobility training, Gait training, Stair training, Neuromuscular re-education, Manual, Pain management, Home exercise program, Safety education & training, Patient/Caregiver education & training, Equipment evaluation, education, & procurement, Modalities  Modalities: Heat/Cold, Vasopneumatic Device  Additional Comments: continue current POC  Pt to continue current HEP.  See objective section for any therapeutic exercise changes, additions or modifications this date.    Therapy Time:      PT Individual Minutes  Time In: 1258  Time  Out: 1342  Minutes: 44  Timed Code Treatment Minutes: 44 Minutes  Procedure Minutes:0  Timed Activity Minutes Units   Ther Ex 44 3        Electronically signed by Jitendra Patel PTA on 3/2/23 at 1:47 PM EST

## 2023-03-06 ENCOUNTER — HOSPITAL ENCOUNTER (OUTPATIENT)
Dept: PHYSICAL THERAPY | Age: 58
Setting detail: THERAPIES SERIES
Discharge: HOME | End: 2023-03-06
Payer: COMMERCIAL

## 2023-03-06 PROCEDURE — 97110 THERAPEUTIC EXERCISES: CPT

## 2023-03-06 NOTE — PROGRESS NOTES
[address] [facility]  Outpatient Physical Therapy    Treatment Note        Date: 3/6/2023  Patient: Salo Tran  : 1965   Confirmed: Yes  MRN: [id number]  Referring Provider: Cassy Rider MD    Medical Diagnosis: Primary osteoarthritis of left knee [M17.12]       Treatment Diagnosis: L knee pain, L knee edema, decreased L knee ROM, decreased LLE strength, impaired gait and functional mobility s/p TKA    Visit Information:  Insurance: Payor: Fiorella Zavala / Plan: BCBS - [place] PPO / Product Type: *No Product type* /   PT Visit Information  Onset Date: 22  PT Insurance Information: BCBC  Total # of Visits Approved: 50  Total # of Visits to Date: 18  No Show: 0  Canceled Appointment: 0  Progress Note Counter:  * (corrected count 3/6/23)    Subjective Information:  Subjective: Pt reports the knee was sore following my last visit. HEP Compliance:  [x] Good [] Fair [] Poor [] Reports not doing due to:    Pain Screening  Patient Currently in Pain: Denies    Treatment:  Exercises:  Exercises  Exercise 1: Eliptical L2 x 6 minutes  Exercise 2: Sled push/pull 3 x 30 ft added 50#  Exercise 3: Split Leg DL w. KB 2 x 10 ochoa (15#)  Exercise 4: STS w/ 20# x 10 , Seated squats (butt taps) w/ 20# x 10  Exercise 5: BOSU lunges F/L x10 ea B - UE support on knee in order to improve balance and stabiltiy. Exercise 7: Apollo HS curls 30# 2 x 15  Exercise 8: apollo leg press20#  2x 15 ochoa LE, 1st plate, single leg: 2 x 5  Exercise 12: farmer's carry 30# KB  single arm 30' x3 each UE  Treatment Reasoning  Limitations addressed: Mobility, Strength      Objective Measures:          Assessment: Body Structures, Functions, Activity Limitations Requiring Skilled Therapeutic Intervention: Increased pain, Decreased ROM, Decreased strength, Decreased functional mobility , Decreased balance, Decreased high-level IADLs  Assessment: Continue to focus tx session on strengthening.  Reviewed variations of current tx to allow pt to perform at home with use of free weights with pt reporting having dumbells up to 25#. Pt requried UE support with lunges on Bosu ball, minimal cues for form and technique as to displayed a good understanding of exercises. Pt complete 8 inch stairs without UE support in reciprocal pattern with good form and technqiue. Treatment Diagnosis: L knee pain, L knee edema, decreased L knee ROM, decreased LLE strength, impaired gait and functional mobility s/p TKA  Therapy Prognosis: Good       Patient Education: [x] NA       Post-Pain Assessment:       Pain Rating (0-10 pain scale):   0/10   Location and pain description same as pre-treatment unless indicated. Action: [] NA   [x] Perform HEP  [] Meds as prescribed  [] Modalities as prescribed   [] Call Physician     GOALS   Patient Goal(s): Patient Goals : walk normal, return to work      Long Term Goals Completed by 4-6 weeks Goal Status   LTG 1 L knee ROM 0-120 w/o pain to improve ability to squat for work duties In progress   LTG 2 Toi LE strength 5/5 to allow reciprocal stair climbing and squatting/kneeling for work duties In progress   LTG 3 Decreased L knee pain to < 2/10 with ADLs and ambulation In progress   LTG 4 90% PLOF or better via subjective report or functional survey In progress   LTG 5 [place] with HEP In progress     Plan:  Frequency/Duration:  Plan  Plan Frequency: 1-2  Plan weeks: 4  Specific Instructions for Next Treatment: PN for return to MD DC vs continue? Current Treatment Recommendations: ROM, Strengthening, Balance training, Functional mobility training, Gait training, Stair training, Neuromuscular re-education, Manual, Pain management, Home exercise program, Safety education & training, Patient/Caregiver education & training, Equipment evaluation, education, & procurement, Modalities  Modalities: Heat/Cold, Vasopneumatic Device  Additional Comments: continue current POC  Pt to continue current HEP.   See objective section for any therapeutic exercise changes, additions or modifications this date.     Therapy Time:      PT Individual Minutes  Time In: 2931  Time Out: 2516  Minutes: 43  Timed Code Treatment Minutes: 43 Minutes  Procedure Minutes:0  Timed Activity Minutes Units   Ther Ex 43 3     Electronically signed by David Hernandez PTA on 3/6/23 at 10:42 AM EST

## 2023-03-09 ENCOUNTER — HOSPITAL ENCOUNTER (OUTPATIENT)
Dept: PHYSICAL THERAPY | Age: 58
Setting detail: THERAPIES SERIES
Discharge: HOME | End: 2023-03-09
Payer: COMMERCIAL

## 2023-03-09 PROCEDURE — 97110 THERAPEUTIC EXERCISES: CPT

## 2023-03-09 ASSESSMENT — PAIN SCALES - GENERAL: PAINLEVEL_OUTOF10: 3

## 2023-03-09 ASSESSMENT — PAIN DESCRIPTION - LOCATION: LOCATION: KNEE

## 2023-03-09 ASSESSMENT — PAIN DESCRIPTION - DESCRIPTORS: DESCRIPTORS: NUMBNESS

## 2023-03-09 ASSESSMENT — PAIN DESCRIPTION - ORIENTATION: ORIENTATION: OUTER;LEFT

## 2023-03-09 NOTE — PROGRESS NOTES
[facility] [facility]  Outpatient Physical Therapy    Treatment Note        Date: 3/9/2023  Patient: Claudean Millard  : 1965   Confirmed: Yes  MRN: [id number]  Referring Provider: Miriam Restrepo MD    Medical Diagnosis: Primary osteoarthritis of left knee [M17.12]       Treatment Diagnosis: L knee pain, L knee edema, decreased L knee ROM, decreased LLE strength, impaired gait and functional mobility s/p TKA    Visit Information:  Insurance: Payor: Steph Brownlee / Plan: BCBS - [place] PPO / Product Type: *No Product type* /   PT Visit Information  Onset Date: 22  PT Insurance Information: BCBC  Total # of Visits Approved: 50  Total # of Visits to Date:   No Show: 0  Canceled Appointment: 0  Progress Note Counter:     Subjective Information:  Subjective: Pt reports her lateral knee is a little sore today d/t climbing up and down the attic ladder home. Pt reports she feels ready to continue independently with a home exercise program. Pt goes back to see surgeon on 3/16/23.   HEP Compliance:  [x] Good [] Fair [] Poor [] Reports not doing due to:    Pain Screening  Patient Currently in Pain: Yes  Pain Assessment: 0-10  Pain Level: 3  Pain Location: Knee  Pain Orientation: Outer, Left  Pain Descriptors: Numbness (stiff)    Treatment:  Exercises:  Exercises  Exercise 1: ROM/strength measures  Exercise 2: 12\" step ups, fwd and lateral x 15 ea ochoa  Exercise 3: calf raises of edge of step: x 15 DL; 1 set to fatigue SL  Exercise 4: BW squats x 10  Exercise 5: stationary mini lunges: x 10 ochoa  Exercise 6: supine HS stretches: 20\" x 5 LLE       Objective Measures:     LTG 1 Current Status[de-identified] 3/9/23: 0-117 deg AROM  LTG 2 Current Status[de-identified] 3/9/23: 5/5 via MMT; stairs reciprocal w/o UE support, squats to 75% depth, kneels onto non-surgical leg with good strength to stand  LTG 3 Current Status[de-identified] 3/9/23: 0-3/10, described as soreness that is dependent on activity performed the day prior  LTG 4 Current Status[de-identified] 3/9/23: subjectively reports 90% PLOF; LEFS =68/80  LTG 5 Current Status[de-identified] 3/9/23: reports independence with HEP          Assessment: Body Structures, Functions, Activity Limitations Requiring Skilled Therapeutic Intervention: Increased pain, Decreased ROM, Decreased strength, Decreased functional mobility , Decreased balance, Decreased high-level IADLs  Assessment: Pt has completed 19 PT visits from 1/4/23 to 3/9/23 s/p L TKA. At this time pt reports readiness to continue her HEP indpendently. Pt displays much improved L knee ROM with full extension and flexion > 115 deg. Pt displays good functional strength with ability to squat, reciprocally climb up/down steps, and kneel to her non-surgical leg. Pt is set to f/u with surgeon's office next week to discuss return to work and any recommended activity restrictions or precautions given the physical demands of her employment. Pt currently displays good understanding of her HEP and is appropriate for therapy discharge. Treatment Diagnosis: L knee pain, L knee edema, decreased L knee ROM, decreased LLE strength, impaired gait and functional mobility s/p TKA  Therapy Prognosis: Good       Post-Pain Assessment:       Pain Rating (0-10 pain scale):  0 /10   Location and pain description same as pre-treatment unless indicated.    Action: [x] NA   [] Perform HEP  [] Meds as prescribed  [] Modalities as prescribed   [] Call Physician     GOALS   Patient Goal(s): Patient Goals : walk normal, return to work    Long Term Goals Completed by 4-6 weeks Goal Status   LTG 1 L knee ROM 0-120 w/o pain to improve ability to squat for work duties Partially met   LTG 2 Toi LE strength 5/5 to allow reciprocal stair climbing and squatting/kneeling for work duties Met   LTG 3 Decreased L knee pain to < 2/10 with ADLs and ambulation Partially met   LTG 4 90% PLOF or better via subjective report or functional survey Met   LTG 5 [place] with HEP Met Plan:  Frequency/Duration:  Plan  Additional Comments: D/C PT POC  Pt to continue current HEP. See objective section for any therapeutic exercise changes, additions or modifications this date.     Therapy Time:      PT Individual Minutes  Time In: 1030  Time Out: 4292  Minutes: 44  Timed Code Treatment Minutes: 44 Minutes  Procedure Minutes: 0  Timed Activity Minutes Units   Ther Ex 44 3     Electronically signed by Gonzalez Glass PT on 3/9/23 at 11:43 AM EST

## 2023-03-09 NOTE — PROGRESS NOTES
[address]  PHYSICAL THERAPY PLAN OF CARE   [address] [place], [address] [facility]         Ph: [phone]  Fax: [phone]    [] Certification  [] Recertification []  Plan of Care  [] Progress Note [x] Discharge      Referring Provider: Say Evans MD     From:  Britt Decker, PT, DPT  Patient: Patricio Davis (59 y.o. female) : 1965 Date: 3/9/2023  Medical Diagnosis: Primary osteoarthritis of left knee [M17.12]       Treatment Diagnosis: L knee pain, L knee edema, decreased L knee ROM, decreased LLE strength, impaired gait and functional mobility s/p TKA    Progress Report Period from:  2023  to 3/9/2023    Visits to Date: 19 No Show: 0 Cancelled Appts: 0    OBJECTIVE:   Long Term Goals - Time Frame for Long Term Goals : 4-6 weeks  Goals Current/ Discharge status Status   Long Term Goal 1: L knee ROM 0-120 w/o pain to improve ability to squat for work duties LTG 1 Current Status[de-identified] 3/9/23: 0-117 deg AROM   Partially met   Long Term Goal 2: Toi LE strength 5/5 to allow reciprocal stair climbing and squatting/kneeling for work duties LTG 2 Current Status[de-identified] 3/9/23: 5/5 via MMT; stairs reciprocal w/o UE support, squats to 75% depth, kneels onto non-surgical leg with good strength to stand   Met   Long Term Goal 3: Decreased L knee pain to < 2/10 with ADLs and ambulation LTG 3 Current Status[de-identified] 3/9/23: 0-3/10, described as soreness that is dependent on activity performed the day prior   Partially met   Long Term Goal 4: 90% PLOF or better via subjective report or functional survey LTG 4 Current Status[de-identified] 3/9/23: subjectively reports 90% PLOF; LEFS =   Met   Long Term Goal 5:  [place] with HEP LTG 5 Current Status[de-identified] 3/9/23: reports independence with HEP   Met     Body Structures, Functions, Activity Limitations Requiring Skilled Therapeutic Intervention: Increased pain, Decreased ROM, Decreased strength, Decreased functional mobility , Decreased balance, Decreased high-level IADLs  Assessment: Pt has completed 19 PT visits from 1/4/23 to 3/9/23 s/p L TKA. At this time pt reports readiness to continue her HEP indpendently. Pt displays much improved L knee ROM with full extension and flexion > 115 deg. Pt displays good functional strength with ability to squat, reciprocally climb up/down steps, and kneel to her non-surgical leg. Pt is set to f/u with surgeon's office next week to discuss return to work and any recommended activity restrictions or precautions given the physical demands of her employment. Pt currently displays good understanding of her HEP and is appropriate for therapy discharge. Therapy Prognosis: Good    PLAN:   Frequency/Duration:  Additional Comments: D/C PT POC                     Patient Status:[] Continue/ Initiate plan of Care     [x] Discharge PT. Recommend pt continue with HEP. [] Additional visits requested, Please re-certify for additional visits:     [] Hold        Signature: Electronically signed by Shruthi Hector, PT on 3/9/23 at 11:47 AM EST      If you have any questions or concerns, please don't hesitate to call.   Thank you for your referral.

## 2023-03-16 ENCOUNTER — OFFICE VISIT (OUTPATIENT)
Dept: ORTHOPEDIC SURGERY | Age: 58
End: 2023-03-16
Payer: COMMERCIAL

## 2023-03-16 VITALS
BODY MASS INDEX: 26.22 KG/M2 | HEIGHT: 63 IN | WEIGHT: 148 LBS | HEART RATE: 76 BPM | OXYGEN SATURATION: 97 % | TEMPERATURE: 97.3 F

## 2023-03-16 DIAGNOSIS — M17.12 PRIMARY OSTEOARTHRITIS OF LEFT KNEE: Primary | ICD-10-CM

## 2023-03-16 PROCEDURE — 99213 OFFICE O/P EST LOW 20 MIN: CPT | Performed by: ORTHOPAEDIC SURGERY

## 2023-03-16 RX ORDER — CELECOXIB 200 MG/1
CAPSULE ORAL
COMMUNITY
Start: 2023-02-21

## 2023-03-16 RX ORDER — ESCITALOPRAM OXALATE 20 MG/1
TABLET ORAL
COMMUNITY
Start: 2023-02-21

## 2023-03-16 RX ORDER — MELOXICAM 15 MG/1
15 TABLET ORAL DAILY
Qty: 30 TABLET | Refills: 1 | Status: SHIPPED | OUTPATIENT
Start: 2023-03-16 | End: 2023-04-15

## 2023-03-16 NOTE — LETTER
March 16, 2023       Goldstein Dallas YOB: 1965   [address] [place] Date of Visit:  3/16/2023       To Whom It May Concern:    Venkat Arzate was seen in my clinic on 3/16/2023. She may return to work full duty on 03/27/2023    If you have any questions or concerns, please don't hesitate to call.     Sincerely,        Angeli Noguera MD

## 2023-03-17 NOTE — PROGRESS NOTES
Narrative Referring Provider:   Conrad Fall Jr      PCP:   MEETA KINGSTON MD    ============================  IMPRESSION/PLAN:  ============================  Annalisa Rojo is s/p left Total knee replacement completed on 2022.    IMPRESSION: Excellent early outcome and No complaints or limitations    PLAN:  No new treatment indicated.  Routine follow-up.  Ice compression and elevation  Patient Reassurance: Normal post-operative course discussed with patient.  Patient reassured and supported. All questions answered.    Follow up 2023 X-Rays Needed    Patient presents today for an intermediate post-op visit    Status post op:  left Total knee replacement     BMI: There is no height or weight on file to calculate BMI.    Post-operative recovery was complicated by uneventful/none.    Patient rates their condition as improving.     Does the patient still experience pain?  Minimal pain with prolonged activity    Post Op discharge patient location: in home.   Functional Assessment is as follows: Therapy is completed.  Functional difficulties: None.  Pain Medication: None  Currently Ambulating with: No assistive devices    =================================  EXAM: POST OP KNEE  =================================  Left Post-Operative Knee    Ambulates with a normal gait.    SKIN: Appropriate postop appearance, No evidence of erythema, warmth, discharge or drainage and Incision clean/dry/intact.    Range of motion is 0 degrees in extension and   120 degrees of flexion.    Extension La degrees    Pain with ROM: No    There is minimal effusion.     Mal-alignment: No    No tenderness to palpation    Neurovascular Status: Sensation Intact, Moves foot and ankle up & down, 2+ dorsalis pedis and negative homans sign    Stability:Varus/Valgus- Yes, stable    Quad strength: improving    Imaging:  None    Provider:   Conrad Fall MD

## 2023-05-18 ENCOUNTER — HOSPITAL ENCOUNTER (OUTPATIENT)
Dept: WOMENS IMAGING | Age: 58
Discharge: HOME | End: 2023-05-20
Payer: COMMERCIAL

## 2023-05-18 DIAGNOSIS — Z12.31 ENCOUNTER FOR SCREENING MAMMOGRAM FOR BREAST CANCER: ICD-10-CM

## 2023-05-18 PROCEDURE — 77063 BREAST TOMOSYNTHESIS BI: CPT

## 2023-06-27 ENCOUNTER — TELEPHONE (OUTPATIENT)
Dept: ORTHOPEDIC SURGERY | Age: 58
End: 2023-06-27

## 2023-07-05 ENCOUNTER — OFFICE VISIT (OUTPATIENT)
Dept: ORTHOPEDIC SURGERY | Age: 58
End: 2023-07-05
Payer: COMMERCIAL

## 2023-07-05 DIAGNOSIS — M17.12 PRIMARY OSTEOARTHRITIS OF LEFT KNEE: Primary | ICD-10-CM

## 2023-07-05 PROCEDURE — 99214 OFFICE O/P EST MOD 30 MIN: CPT | Performed by: PHYSICIAN ASSISTANT

## 2023-07-05 ASSESSMENT — ENCOUNTER SYMPTOMS
EYES NEGATIVE: 1
GASTROINTESTINAL NEGATIVE: 1
RESPIRATORY NEGATIVE: 1

## 2023-07-05 NOTE — PROGRESS NOTES
Reji Walls (:  1965) is a 59 y.o. female,Established patient, here for evaluation of the following chief complaint(s):  Follow-up (Follow-up visit for left knee)         ASSESSMENT/PLAN:  1. Primary osteoarthritis of left knee      No follow-ups on file. Subjective   SUBJECTIVE/OBJECTIVE:  3year-old female complaining of left knee achiness. She had her left knee replaced 2022. She has been doing well she declined bilateral Penny recently. States her knee is sore. She denies instability. She denies any continued pain. She is been using topical anti-inflammatory creams with improvement. She denies fever or chills      Review of Systems   Constitutional: Negative. HENT: Negative. Eyes: Negative. Respiratory: Negative. Gastrointestinal: Negative. Genitourinary: Negative. Musculoskeletal: Negative. Psychiatric/Behavioral: Negative. Objective   Physical Exam  Musculoskeletal:      Comments: Left knee-no joint effusion. Well-healed midline surgical incisional scar. Full extension, flexion is 115 degrees. Knee joint is stable, there is no laxity with valgus or varus stress. Calf is soft and nontender        Explained to the patient that she been more active and she is having some muscle achiness. I recommended she at the end of the day. She may continue on home exercises. She may take Tylenol for pain. Her symptoms of pain has all but resolved. Should her symptoms not improve sufficiently in the future I would be happy to see her back in the future. On this date 2023 I have spent 35 minutes reviewing previous notes, test results and face to face with the patient discussing the diagnosis and importance of compliance with the treatment plan as well as documenting on the day of the visit. An electronic signature was used to authenticate this note.     --ESTEPHANIA Bellamy

## 2024-01-18 ENCOUNTER — OFFICE VISIT (OUTPATIENT)
Dept: ORTHOPEDIC SURGERY | Age: 59
End: 2024-01-18

## 2024-01-18 DIAGNOSIS — Z96.651 PRESENCE OF TOTAL KNEE JOINT PROSTHESIS, RIGHT: ICD-10-CM

## 2024-01-18 DIAGNOSIS — Z96.652 PRESENCE OF TOTAL KNEE JOINT PROSTHESIS, LEFT: Primary | ICD-10-CM

## 2024-01-18 DIAGNOSIS — Z96.642 PRESENCE OF ARTIFICIAL HIP JOINT, LEFT: Primary | ICD-10-CM

## 2024-01-18 RX ORDER — AMOXICILLIN 500 MG/1
CAPSULE ORAL
Qty: 6 CAPSULE | Refills: 2 | Status: SHIPPED | OUTPATIENT
Start: 2024-01-18

## 2024-01-19 NOTE — PROGRESS NOTES
Narrative Referring Provider:   Conrad Fall Jr      PCP:   Leatha Toro MD    ============================  IMPRESSION/PLAN:  ============================  Annalisa Rojo is s/p left Total knee replacement completed on 2022.    IMPRESSION: No complaints or limitations and Excellent long-term outcome    PLAN:  No new treatment indicated.  Routine follow-up.    Patient Reassurance: Normal post-operative course discussed with patient.  Patient reassured and supported. All questions answered.    Follow up 5 years X-Rays Needed    Patient presents today for a long-term post-op visit    Status post op:  left Total knee replacement     BMI: There is no height or weight on file to calculate BMI.    Post-operative recovery was complicated by uneventful/none.    Patient rates their condition as improving.     Does the patient still experience pain?  None    Post Op discharge patient location: in home.   Functional Assessment is as follows: Therapy is completed.  Functional difficulties: None.  Pain Medication: None  Currently Ambulating with: No assistive devices    =================================  EXAM: POST OP KNEE  =================================  Left Post-Operative Knee    Ambulates with a limp favoring the right.    SKIN: Appropriate postop appearance, No evidence of erythema, warmth, discharge or drainage and Incision clean/dry/intact.    Range of motion is 0 degrees in extension and   120 degrees of flexion.    Extension La degrees    Pain with ROM: No    There is Mild effusion.     Mal-alignment: No    No tenderness to palpation    Neurovascular Status: Sensation Intact, Moves foot and ankle up & down, 2+ dorsalis pedis and negative homans sign    Stability:Varus/Valgus- Yes, stable    Quad strength: improving    Imaging:  AP, lateral and sunrise of left knee were personally reviewed and they reveal:  There is no evidence of any fracture.  Patient has a press-fit left total knee replacement in

## 2024-02-28 ENCOUNTER — HOSPITAL ENCOUNTER (OUTPATIENT)
Dept: RADIOLOGY | Facility: HOSPITAL | Age: 59
Discharge: HOME | End: 2024-02-28
Payer: COMMERCIAL

## 2024-02-28 DIAGNOSIS — S63.502A UNSPECIFIED SPRAIN OF LEFT WRIST, INITIAL ENCOUNTER: ICD-10-CM

## 2024-02-28 PROCEDURE — 73200 CT UPPER EXTREMITY W/O DYE: CPT | Mod: LEFT SIDE | Performed by: RADIOLOGY

## 2024-02-28 PROCEDURE — 73200 CT UPPER EXTREMITY W/O DYE: CPT | Mod: LT

## 2024-05-09 ENCOUNTER — HOSPITAL ENCOUNTER (EMERGENCY)
Facility: HOSPITAL | Age: 59
Discharge: HOME | End: 2024-05-09
Payer: COMMERCIAL

## 2024-05-09 VITALS
TEMPERATURE: 98.1 F | RESPIRATION RATE: 18 BRPM | OXYGEN SATURATION: 99 % | BODY MASS INDEX: 23 KG/M2 | DIASTOLIC BLOOD PRESSURE: 88 MMHG | SYSTOLIC BLOOD PRESSURE: 148 MMHG | WEIGHT: 125 LBS | HEART RATE: 78 BPM | HEIGHT: 62 IN

## 2024-05-09 DIAGNOSIS — B34.9 VIRAL SYNDROME: Primary | ICD-10-CM

## 2024-05-09 LAB
ALBUMIN SERPL BCP-MCNC: 4.4 G/DL (ref 3.4–5)
ALP SERPL-CCNC: 75 U/L (ref 33–136)
ALT SERPL W P-5'-P-CCNC: 10 U/L (ref 7–45)
ANION GAP SERPL CALC-SCNC: 12 MMOL/L (ref 10–20)
APPEARANCE UR: ABNORMAL
AST SERPL W P-5'-P-CCNC: 15 U/L (ref 9–39)
BASOPHILS # BLD AUTO: 0.05 X10*3/UL (ref 0–0.1)
BASOPHILS NFR BLD AUTO: 0.5 %
BILIRUB SERPL-MCNC: 0.4 MG/DL (ref 0–1.2)
BILIRUB UR STRIP.AUTO-MCNC: NEGATIVE MG/DL
BUN SERPL-MCNC: 12 MG/DL (ref 6–23)
CALCIUM SERPL-MCNC: 9.7 MG/DL (ref 8.6–10.3)
CHLORIDE SERPL-SCNC: 104 MMOL/L (ref 98–107)
CO2 SERPL-SCNC: 25 MMOL/L (ref 21–32)
COLOR UR: YELLOW
CREAT SERPL-MCNC: 0.6 MG/DL (ref 0.5–1.05)
EGFRCR SERPLBLD CKD-EPI 2021: >90 ML/MIN/1.73M*2
EOSINOPHIL # BLD AUTO: 0 X10*3/UL (ref 0–0.7)
EOSINOPHIL NFR BLD AUTO: 0 %
ERYTHROCYTE [DISTWIDTH] IN BLOOD BY AUTOMATED COUNT: 13 % (ref 11.5–14.5)
FLUAV RNA RESP QL NAA+PROBE: NOT DETECTED
FLUBV RNA RESP QL NAA+PROBE: NOT DETECTED
GLUCOSE SERPL-MCNC: 114 MG/DL (ref 74–99)
GLUCOSE UR STRIP.AUTO-MCNC: NEGATIVE MG/DL
HCT VFR BLD AUTO: 42.4 % (ref 36–46)
HGB BLD-MCNC: 14.1 G/DL (ref 12–16)
IMM GRANULOCYTES # BLD AUTO: 0.03 X10*3/UL (ref 0–0.7)
IMM GRANULOCYTES NFR BLD AUTO: 0.3 % (ref 0–0.9)
KETONES UR STRIP.AUTO-MCNC: NEGATIVE MG/DL
LACTATE SERPL-SCNC: 1.4 MMOL/L (ref 0.4–2)
LEUKOCYTE ESTERASE UR QL STRIP.AUTO: ABNORMAL
LIPASE SERPL-CCNC: 27 U/L (ref 9–82)
LYMPHOCYTES # BLD AUTO: 1.74 X10*3/UL (ref 1.2–4.8)
LYMPHOCYTES NFR BLD AUTO: 16 %
MCH RBC QN AUTO: 26.8 PG (ref 26–34)
MCHC RBC AUTO-ENTMCNC: 33.3 G/DL (ref 32–36)
MCV RBC AUTO: 81 FL (ref 80–100)
MONOCYTES # BLD AUTO: 0.48 X10*3/UL (ref 0.1–1)
MONOCYTES NFR BLD AUTO: 4.4 %
MUCOUS THREADS #/AREA URNS AUTO: NORMAL /LPF
NEUTROPHILS # BLD AUTO: 8.6 X10*3/UL (ref 1.2–7.7)
NEUTROPHILS NFR BLD AUTO: 78.8 %
NITRITE UR QL STRIP.AUTO: NEGATIVE
NRBC BLD-RTO: 0 /100 WBCS (ref 0–0)
PH UR STRIP.AUTO: 7 [PH]
PLATELET # BLD AUTO: 349 X10*3/UL (ref 150–450)
POTASSIUM SERPL-SCNC: 3.9 MMOL/L (ref 3.5–5.3)
PROT SERPL-MCNC: 7.4 G/DL (ref 6.4–8.2)
PROT UR STRIP.AUTO-MCNC: NEGATIVE MG/DL
RBC # BLD AUTO: 5.26 X10*6/UL (ref 4–5.2)
RBC # UR STRIP.AUTO: NEGATIVE /UL
RBC #/AREA URNS AUTO: NORMAL /HPF
S PYO DNA THROAT QL NAA+PROBE: NOT DETECTED
SARS-COV-2 RNA RESP QL NAA+PROBE: NOT DETECTED
SODIUM SERPL-SCNC: 137 MMOL/L (ref 136–145)
SP GR UR STRIP.AUTO: 1.02
SQUAMOUS #/AREA URNS AUTO: NORMAL /HPF
UROBILINOGEN UR STRIP.AUTO-MCNC: <2 MG/DL
WBC # BLD AUTO: 10.9 X10*3/UL (ref 4.4–11.3)
WBC #/AREA URNS AUTO: NORMAL /HPF

## 2024-05-09 PROCEDURE — 80053 COMPREHEN METABOLIC PANEL: CPT | Performed by: PHYSICIAN ASSISTANT

## 2024-05-09 PROCEDURE — 99284 EMERGENCY DEPT VISIT MOD MDM: CPT | Mod: 25

## 2024-05-09 PROCEDURE — 83690 ASSAY OF LIPASE: CPT | Performed by: PHYSICIAN ASSISTANT

## 2024-05-09 PROCEDURE — 96361 HYDRATE IV INFUSION ADD-ON: CPT

## 2024-05-09 PROCEDURE — 81001 URINALYSIS AUTO W/SCOPE: CPT | Performed by: PHYSICIAN ASSISTANT

## 2024-05-09 PROCEDURE — 2500000004 HC RX 250 GENERAL PHARMACY W/ HCPCS (ALT 636 FOR OP/ED): Performed by: PHYSICIAN ASSISTANT

## 2024-05-09 PROCEDURE — 85025 COMPLETE CBC W/AUTO DIFF WBC: CPT | Performed by: PHYSICIAN ASSISTANT

## 2024-05-09 PROCEDURE — 36415 COLL VENOUS BLD VENIPUNCTURE: CPT | Performed by: PHYSICIAN ASSISTANT

## 2024-05-09 PROCEDURE — 87651 STREP A DNA AMP PROBE: CPT | Performed by: PHYSICIAN ASSISTANT

## 2024-05-09 PROCEDURE — 87086 URINE CULTURE/COLONY COUNT: CPT | Mod: ELYLAB | Performed by: PHYSICIAN ASSISTANT

## 2024-05-09 PROCEDURE — 87636 SARSCOV2 & INF A&B AMP PRB: CPT | Performed by: PHYSICIAN ASSISTANT

## 2024-05-09 PROCEDURE — 83605 ASSAY OF LACTIC ACID: CPT | Performed by: PHYSICIAN ASSISTANT

## 2024-05-09 PROCEDURE — 96374 THER/PROPH/DIAG INJ IV PUSH: CPT

## 2024-05-09 RX ORDER — ONDANSETRON HYDROCHLORIDE 2 MG/ML
4 INJECTION, SOLUTION INTRAVENOUS ONCE
Status: COMPLETED | OUTPATIENT
Start: 2024-05-09 | End: 2024-05-09

## 2024-05-09 RX ORDER — ONDANSETRON 4 MG/1
4 TABLET, ORALLY DISINTEGRATING ORAL EVERY 8 HOURS PRN
Qty: 20 TABLET | Refills: 0 | Status: SHIPPED | OUTPATIENT
Start: 2024-05-09 | End: 2024-05-16

## 2024-05-09 RX ADMIN — SODIUM CHLORIDE 1000 ML: 9 INJECTION, SOLUTION INTRAVENOUS at 14:20

## 2024-05-09 RX ADMIN — ONDANSETRON 4 MG: 2 INJECTION, SOLUTION INTRAMUSCULAR; INTRAVENOUS at 14:20

## 2024-05-09 ASSESSMENT — PAIN - FUNCTIONAL ASSESSMENT: PAIN_FUNCTIONAL_ASSESSMENT: 0-10

## 2024-05-09 ASSESSMENT — COLUMBIA-SUICIDE SEVERITY RATING SCALE - C-SSRS
6. HAVE YOU EVER DONE ANYTHING, STARTED TO DO ANYTHING, OR PREPARED TO DO ANYTHING TO END YOUR LIFE?: NO
2. HAVE YOU ACTUALLY HAD ANY THOUGHTS OF KILLING YOURSELF?: NO
1. IN THE PAST MONTH, HAVE YOU WISHED YOU WERE DEAD OR WISHED YOU COULD GO TO SLEEP AND NOT WAKE UP?: NO

## 2024-05-09 ASSESSMENT — LIFESTYLE VARIABLES
HAVE PEOPLE ANNOYED YOU BY CRITICIZING YOUR DRINKING: NO
EVER HAD A DRINK FIRST THING IN THE MORNING TO STEADY YOUR NERVES TO GET RID OF A HANGOVER: NO
EVER FELT BAD OR GUILTY ABOUT YOUR DRINKING: NO
TOTAL SCORE: 0
HAVE YOU EVER FELT YOU SHOULD CUT DOWN ON YOUR DRINKING: NO

## 2024-05-09 ASSESSMENT — PAIN SCALES - GENERAL
PAINLEVEL_OUTOF10: 5 - MODERATE PAIN
PAINLEVEL_OUTOF10: 0 - NO PAIN

## 2024-05-09 ASSESSMENT — PAIN DESCRIPTION - LOCATION: LOCATION: GENERALIZED

## 2024-05-09 NOTE — ED TRIAGE NOTES
"Pt to ED for c/o flu like symptoms X1 week \"doesn't feel right and I have poison ivy\".  Pt states N, sore throat, dry heaves .  Respirations even and unlabored.  No acute distress noted at this time.  Denies CP and SOB.  A&Ox4.  VSS.    "

## 2024-05-09 NOTE — Clinical Note
Araceli Pearson was seen and treated in our emergency department on 5/9/2024.  She may return to work on 05/11/2024.       If you have any questions or concerns, please don't hesitate to call.      Bridgett Valdes PA-C

## 2024-05-09 NOTE — ED PROVIDER NOTES
"HPI   Chief Complaint   Patient presents with    Flu Symptoms     X1 week \"doesn't feel right and I have poison ivy\".  Pt states N, sore throat, dry heaves     Rash     Taking prednisone for poison ivy       58-year-old female presents to the emergency department for complaints of \"I do not feel right and I have poison ivy.  \"She states she has poison ivy on her hands and the left side of her face.  She was prescribed prednisone on Monday which has improved her rash.  She states for a week, she has felt hot and not had an appetite.  She has vomited here and there.  She denies chest pain, shortness of breath, abdominal pain, diarrhea, constipation, urinary symptoms, cough or cold symptoms, sick contacts.  She has taken prednisone in the past with no adverse reactions.                          [place] Coma Scale Score: 15                     Patient History   Past Medical History:   Diagnosis Date    Personal history of other specified conditions 06/21/2020    History of insomnia     Past Surgical History:   Procedure Laterality Date    OTHER SURGICAL HISTORY  06/09/2020    Knee surgery     No family history on file.  Social History     Tobacco Use    Smoking status: Not on file    Smokeless tobacco: Not on file   Substance Use Topics    Alcohol use: Not on file    Drug use: Not on file       Physical Exam   ED Triage Vitals   Temperature Heart Rate Respirations BP   05/09/24 1334 05/09/24 1334 05/09/24 1334 05/09/24 1334   36.7 °C (98.1 °F) 72 18 164/85      Pulse Ox Temp Source Heart Rate Source Patient Position   05/09/24 1334 05/09/24 1334 05/09/24 1450 05/09/24 1450   98 % Temporal Monitor Lying      BP Location FiO2 (%)     -- --             Physical Exam  Vitals and nursing note reviewed.   Constitutional:       General: She is not in acute distress.  HENT:      Head: Atraumatic.      Mouth/Throat:      Mouth: Mucous membranes are moist.      Pharynx: Oropharynx is clear.   Eyes:      Extraocular Movements: " Extraocular movements intact.      Conjunctiva/sclera: Conjunctivae normal.      Pupils: Pupils are equal, round, and reactive to light.   Cardiovascular:      Rate and Rhythm: Normal rate and regular rhythm.      Pulses: Normal pulses.   Pulmonary:      Effort: Pulmonary effort is normal. No respiratory distress.      Breath sounds: Normal breath sounds.   Abdominal:      General: There is no distension.      Palpations: Abdomen is soft.      Tenderness: There is no abdominal tenderness. There is no guarding or rebound.   Musculoskeletal:         General: No deformity.      Cervical back: Neck supple.   Skin:     General: Skin is warm and dry.   Neurological:      Mental Status: She is alert and oriented to person, place, and time. Mental status is at baseline.      Cranial Nerves: No cranial nerve deficit.      Sensory: No sensory deficit.      Motor: No weakness.   Psychiatric:         Mood and Affect: Mood normal.         Behavior: Behavior normal.         ED Course & MDM   Diagnoses as of 05/09/24 1610   Viral syndrome       Medical Decision Making  58-year-old female presents to the emergency department for generally not feeling well for a week with decreased appetite, occasional nausea and feeling hot.  On my exam, she does not appear to be in acute distress.  Heart and lungs are clear.  Abdomen is soft and nontender.  Neurologic exam is normal with an NIHSS of 0.  She has a few pink papules of each hand and a scant scattering on her left cheek.  Labs do not show leukocytosis, left shift, anemia, metabolic disturbance, renal sufficiency, electrolyte abnormality, elevated lactic acid, elevated lipase.  Flu and COVID are negative.  Patient was treated here with IV fluids and Zofran  Patient is feeling much better.  I discussed close follow-up with her primary doctor.  I prescribed her Zofran.  Discussed results with patient and/or family/friend and recommended close follow up with primary care or specialist.   Reviewed return precautions at length.  I answered all questions.           Procedure  Procedures     Bridgett Valdes PA-C  05/09/24 [id number]

## 2024-05-10 LAB
BACTERIA UR CULT: NORMAL
HOLD SPECIMEN: NORMAL

## 2024-05-13 ENCOUNTER — TRANSCRIBE ORDERS (OUTPATIENT)
Dept: ADMINISTRATIVE | Age: 59
End: 2024-05-13

## 2024-05-13 DIAGNOSIS — Z12.31 ENCOUNTER FOR SCREENING MAMMOGRAM FOR MALIGNANT NEOPLASM OF BREAST: Primary | ICD-10-CM

## 2024-05-22 ENCOUNTER — HOSPITAL ENCOUNTER (OUTPATIENT)
Dept: WOMENS IMAGING | Age: 59
Discharge: HOME | End: 2024-05-24
Payer: COMMERCIAL

## 2024-05-22 VITALS — HEIGHT: 63 IN | BODY MASS INDEX: 26.22 KG/M2

## 2024-05-22 DIAGNOSIS — R92.30 DENSE BREAST TISSUE: ICD-10-CM

## 2024-05-22 DIAGNOSIS — Z12.31 ENCOUNTER FOR SCREENING MAMMOGRAM FOR MALIGNANT NEOPLASM OF BREAST: ICD-10-CM

## 2024-05-22 DIAGNOSIS — Z12.31 VISIT FOR SCREENING MAMMOGRAM: ICD-10-CM

## 2024-05-22 PROCEDURE — 77063 BREAST TOMOSYNTHESIS BI: CPT

## 2024-10-22 ENCOUNTER — TELEPHONE (OUTPATIENT)
Dept: ORTHOPEDIC SURGERY | Age: 59
End: 2024-10-22

## 2025-02-24 ENCOUNTER — TELEPHONE (OUTPATIENT)
Dept: ORTHOPEDIC SURGERY | Age: 60
End: 2025-02-24

## 2025-02-24 RX ORDER — AMOXICILLIN 500 MG/1
CAPSULE ORAL
Qty: 6 CAPSULE | Refills: 0 | Status: SHIPPED | OUTPATIENT
Start: 2025-02-24

## 2025-02-24 NOTE — TELEPHONE ENCOUNTER
Requested Prescriptions     Signed Prescriptions Disp Refills    amoxicillin (AMOXIL) 500 MG capsule 6 capsule 0     Sig: Take 4 tablets by mouth 1 hour prior to procedure and 2 tablets by mouth 6 hours after procedure     Authorizing Provider: SANDRA MCRAE

## 2025-02-24 NOTE — TELEPHONE ENCOUNTER
Patient called and she is going to the dentist and needs a antibiotic [facility] please advise

## 2025-06-06 ENCOUNTER — OFFICE VISIT (OUTPATIENT)
Dept: URGENT CARE | Age: 60
End: 2025-06-06
Payer: COMMERCIAL

## 2025-06-06 DIAGNOSIS — Z11.1 PPD SCREENING TEST: ICD-10-CM

## 2025-06-06 PROCEDURE — 86580 TB INTRADERMAL TEST: CPT | Performed by: PHYSICIAN ASSISTANT

## 2025-06-06 RX ORDER — VENLAFAXINE HYDROCHLORIDE 150 MG/1
150 CAPSULE, EXTENDED RELEASE ORAL
COMMUNITY
Start: 2025-02-26 | End: 2025-08-25

## 2025-06-06 RX ORDER — CELECOXIB 200 MG/1
200 CAPSULE ORAL
COMMUNITY
Start: 2025-01-28

## 2025-06-06 RX ORDER — TRAZODONE HYDROCHLORIDE 150 MG/1
150 TABLET ORAL
COMMUNITY
Start: 2025-04-07

## 2025-06-06 NOTE — PROGRESS NOTES
PPD Placement note  Araceli Pearson, 59 y.o. female is here today for placement of PPD test  Reason for PPD test: employment  Pt taken PPD test before: yes  Verified in allergy area and with patient that they are not allergic to the products PPD is made of (Phenol or Tween). Yes  Is patient taking any oral or IV steroid medication now or have they taken it in the last month? no  Has the patient ever received the BCG vaccine?: no  Has the patient been in recent contact with anyone known or suspected of having active TB disease?: no       Date of exposure (if applicable): N/A       Name of person they were exposed to (if applicable): N/A  Patient's Country of origin?: [place]  O: Alert and oriented in NAD.  P:  PPD placed on 6/6/2025.  Patient advised to return for reading within 48-72 hours.

## 2025-06-08 ENCOUNTER — APPOINTMENT (OUTPATIENT)
Dept: URGENT CARE | Age: 60
End: 2025-06-08
Payer: COMMERCIAL

## 2025-06-08 LAB
INDURATION: 0 MM
TB SKIN TEST: NEGATIVE

## 2025-07-23 ENCOUNTER — HOSPITAL ENCOUNTER (OUTPATIENT)
Dept: WOMENS IMAGING | Age: 60
Discharge: HOME | End: 2025-07-25
Payer: COMMERCIAL

## 2025-07-23 VITALS — BODY MASS INDEX: 26.22 KG/M2 | HEIGHT: 63 IN

## 2025-07-23 DIAGNOSIS — Z12.31 ENCOUNTER FOR SCREENING MAMMOGRAM FOR BREAST CANCER: ICD-10-CM

## 2025-07-23 PROCEDURE — 77063 BREAST TOMOSYNTHESIS BI: CPT

## (undated) DEVICE — NEEDLE SPNL 20GA L3 1 2IN YEL S STL POLYCARB HUB MTL STYL

## (undated) DEVICE — 3M™ STERI-DRAPE™ U-DRAPE 1015: Brand: STERI-DRAPE™

## (undated) DEVICE — MAT FLR ABSRB ECODRI-SAFE

## (undated) DEVICE — SUTURE VCRL SZ 2-0 L36IN ABSRB UD L36MM CT-1 1/2 CIR J945H

## (undated) DEVICE — Device: Brand: STABLECUT®

## (undated) DEVICE — 3M™ IOBAN™ 2 ANTIMICROBIAL INCISE DRAPE 6650EZ: Brand: IOBAN™ 2

## (undated) DEVICE — DRESSING HYDROFIBER AQUACEL AG ADVANTAGE 3.5X12 IN

## (undated) DEVICE — APPLICATOR MEDICATED 26 CC SOLUTION HI LT ORNG CHLORAPREP

## (undated) DEVICE — SUTURE ETHBND EXCEL SZ 1 L30IN NONABSORBABLE GRN L48MM CTX X865H

## (undated) DEVICE — SYRINGE MED 50ML LUERLOCK TIP

## (undated) DEVICE — ADHESIVE SKIN CLSR 0.7ML TOP DERMBND ADV

## (undated) DEVICE — SUTURE MCRYL SZ 3-0 L27IN ABSRB UD L19MM PS-2 3/8 CIR PRIM Y427H

## (undated) DEVICE — BANDAGE COMPR M W6INXL10YD WHT BGE VELC E MTRX HK AND LOOP

## (undated) DEVICE — PIN FIX L3.5IN DIA1/8IN LNG HDLSS FOR MIS KNEE JT REPL

## (undated) DEVICE — 4-PORT MANIFOLD: Brand: NEPTUNE 2

## (undated) DEVICE — BANDAGE,ELASTIC,ESMARK,STERILE,6"X9',LF: Brand: MEDLINE

## (undated) DEVICE — DRAPE SURG EXT FEN REINF ST W O FLD PCH STD

## (undated) DEVICE — GLOVE ORANGE PI 8 1/2   MSG9085

## (undated) DEVICE — PADDING CAST W6INXL4YD RAYON UNDERCAST SOF-ROL

## (undated) DEVICE — YANKAUER,BULB TIP,W/O VENT,RIGID,STERILE: Brand: MEDLINE

## (undated) DEVICE — HOOD: Brand: T7PLUS

## (undated) DEVICE — TABLE COVER: Brand: CONVERTORS

## (undated) DEVICE — PADDING UNDERCAST W6INXL4YD RAYON POLY SYN NONADHESIVE

## (undated) DEVICE — SUTURE VCRL SZ 1 L36IN ABSRB UD L36MM CT-1 1/2 CIR J947H

## (undated) DEVICE — GLOVE SURG SZ 9 THK91MIL LTX FREE SYN POLYISOPRENE ANTI

## (undated) DEVICE — SIPS DUAL 2 MINUTE TIP